# Patient Record
Sex: FEMALE | Race: WHITE | Employment: FULL TIME | URBAN - METROPOLITAN AREA
[De-identification: names, ages, dates, MRNs, and addresses within clinical notes are randomized per-mention and may not be internally consistent; named-entity substitution may affect disease eponyms.]

---

## 2017-10-04 ENCOUNTER — OFFICE VISIT (OUTPATIENT)
Dept: FAMILY MEDICINE | Facility: CLINIC | Age: 37
End: 2017-10-04
Payer: COMMERCIAL

## 2017-10-04 VITALS
DIASTOLIC BLOOD PRESSURE: 68 MMHG | SYSTOLIC BLOOD PRESSURE: 108 MMHG | WEIGHT: 127.5 LBS | HEART RATE: 78 BPM | RESPIRATION RATE: 20 BRPM | TEMPERATURE: 98.3 F | BODY MASS INDEX: 21.55 KG/M2 | OXYGEN SATURATION: 97 %

## 2017-10-04 DIAGNOSIS — R07.0 THROAT PAIN: Primary | ICD-10-CM

## 2017-10-04 LAB
DEPRECATED S PYO AG THROAT QL EIA: NORMAL
SPECIMEN SOURCE: NORMAL

## 2017-10-04 PROCEDURE — 99213 OFFICE O/P EST LOW 20 MIN: CPT | Performed by: FAMILY MEDICINE

## 2017-10-04 PROCEDURE — 87880 STREP A ASSAY W/OPTIC: CPT | Performed by: FAMILY MEDICINE

## 2017-10-04 PROCEDURE — 87081 CULTURE SCREEN ONLY: CPT | Performed by: FAMILY MEDICINE

## 2017-10-04 ASSESSMENT — ANXIETY QUESTIONNAIRES
2. NOT BEING ABLE TO STOP OR CONTROL WORRYING: NOT AT ALL
IF YOU CHECKED OFF ANY PROBLEMS ON THIS QUESTIONNAIRE, HOW DIFFICULT HAVE THESE PROBLEMS MADE IT FOR YOU TO DO YOUR WORK, TAKE CARE OF THINGS AT HOME, OR GET ALONG WITH OTHER PEOPLE: NOT DIFFICULT AT ALL
6. BECOMING EASILY ANNOYED OR IRRITABLE: NOT AT ALL
3. WORRYING TOO MUCH ABOUT DIFFERENT THINGS: NOT AT ALL
1. FEELING NERVOUS, ANXIOUS, OR ON EDGE: NOT AT ALL
5. BEING SO RESTLESS THAT IT IS HARD TO SIT STILL: NOT AT ALL
7. FEELING AFRAID AS IF SOMETHING AWFUL MIGHT HAPPEN: NOT AT ALL
GAD7 TOTAL SCORE: 0

## 2017-10-04 ASSESSMENT — ENCOUNTER SYMPTOMS
DIARRHEA: 0
CHILLS: 1
FEVER: 0
SPUTUM PRODUCTION: 0
ABDOMINAL PAIN: 0
CONSTIPATION: 0
SORE THROAT: 1
CARDIOVASCULAR NEGATIVE: 1
COUGH: 0
NAUSEA: 0
HEADACHES: 1
VOMITING: 0

## 2017-10-04 ASSESSMENT — PATIENT HEALTH QUESTIONNAIRE - PHQ9
5. POOR APPETITE OR OVEREATING: NOT AT ALL
SUM OF ALL RESPONSES TO PHQ QUESTIONS 1-9: 0

## 2017-10-04 NOTE — PROGRESS NOTES
HPI    SUBJECTIVE:   Diana Omer is a 36 year old female who presents to clinic today for the following health issues:    Acute Illness   Acute illness concerns: Strep throat  Onset: x4-5 days ago    Fever: no    Chills/Sweats: no    Headache (location?): YES- temples    Sinus Pressure:no    Conjunctivitis:  YES- burning    Ear Pain: no    Rhinorrhea: no    Congestion: no    Sore Throat: YES- white spots in throat     Cough: no    Wheeze: no    Decreased Appetite: YES    Nausea: YES    Vomiting: no    Diarrhea:  no    Dysuria/Freq.: no    Fatigue/Achiness: YES    Sick/Strep Exposure: no     Therapies Tried and outcome: Airborn and vitamins; feels better today    Throat really started yesterday, does actually feel a bit better.  Does have seasonal allergies, so always feels a bit run down this time of year.    Meds reviewed.  Does not smoke.    Review of Systems   Constitutional: Positive for chills and malaise/fatigue. Negative for fever.   HENT: Positive for sore throat. Negative for congestion.    Respiratory: Negative for cough and sputum production.    Cardiovascular: Negative.    Gastrointestinal: Negative for abdominal pain, constipation, diarrhea, nausea and vomiting.   Skin: Negative for rash.   Neurological: Positive for headaches.         Physical Exam   Constitutional: She is well-developed, well-nourished, and in no distress. No distress.   HENT:   Right Ear: Tympanic membrane, external ear and ear canal normal.   Left Ear: Tympanic membrane, external ear and ear canal normal.   Mouth/Throat: Posterior oropharyngeal edema and posterior oropharyngeal erythema present. No oropharyngeal exudate.   Eyes: Conjunctivae are normal.   Cardiovascular: Normal rate, regular rhythm and normal heart sounds.    Pulmonary/Chest: Effort normal and breath sounds normal.   Lymphadenopathy:     She has no cervical adenopathy.   Skin: Skin is warm and dry. No rash noted.   Nursing note and vitals reviewed.    (R07.0)  Throat pain  (primary encounter diagnosis)  Comment: neg strep, is actually feeling better.  Ibuprofen PRN  Plan: Rapid strep screen, Beta strep group A culture              RTC in 5 d if not feeling better.    David Poole MD

## 2017-10-04 NOTE — NURSING NOTE
"Chief Complaint   Patient presents with     URI       Initial /68  Pulse 78  Temp 98.3  F (36.8  C) (Oral)  Resp 20  Wt 127 lb 8 oz (57.8 kg)  SpO2 97%  Breastfeeding? No  BMI 21.55 kg/m2 Estimated body mass index is 21.55 kg/(m^2) as calculated from the following:    Height as of 10/29/15: 5' 4.5\" (1.638 m).    Weight as of this encounter: 127 lb 8 oz (57.8 kg).  Medication Reconciliation: complete   Gena Sharma CMA (AAMA)      "

## 2017-10-04 NOTE — MR AVS SNAPSHOT
After Visit Summary   10/4/2017    Diana Omer    MRN: [id number]           Patient Information     Date Of Birth          1980        Visit Information        Provider Department      10/4/2017 8:00 AM David Poole MD [facility]        Today's Diagnoses     Throat pain    -  1       Follow-ups after your visit        Follow-up notes from your care team     Return in about 5 days (around 10/9/2017), or if symptoms worsen or fail to improve.      Who to contact     If you have questions or need follow up information about today's clinic visit or your schedule please contact [facility] directly at [phone].  Normal or non-critical lab and imaging results will be communicated to you by MyChart, letter or phone within 4 business days after the clinic has received the results. If you do not hear from us within 7 days, please contact the clinic through [employer] or phone. If you have a critical or abnormal lab result, we will notify you by phone as soon as possible.  Submit refill requests through [employer] or call your pharmacy and they will forward the refill request to us. Please allow 3 business days for your refill to be completed.          Additional Information About Your Visit        MyChart Information     [employer] gives you secure access to your electronic health record. If you see a primary care provider, you can also send messages to your care team and make appointments. If you have questions, please call your primary care clinic.  If you do not have a primary care provider, please call [phone] and they will assist you.        Care EveryWhere ID     This is your Care EveryWhere ID. This could be used by other organizations to access your [place] medical records  [id number]        Your Vitals Were     Pulse Temperature Respirations Pulse Oximetry Breastfeeding? BMI (Body Mass Index)    78 98.3  F (36.8  C) (Oral) 20 97% No 21.55 kg/m2        Blood Pressure from Last 3 Encounters:   10/04/17 108/68   10/29/15 102/60   03/12/15 112/79    Weight from Last 3 Encounters:   10/04/17 127 lb 8 oz (57.8 kg)   10/29/15 124 lb (56.2 kg)   09/14/13 115 lb (52.2 kg)              We Performed the Following     Beta strep group A culture     Rapid strep screen        Primary Care Provider Office Phone # Fax #    David Alistair Poole [phone]       [id number]  [facility] [id number]        Equal Access to Services     [facility]: Hadii aad ku hadasho Soomaali, waaxda luqadaha, qaybta kaalmada adeegyada, chris iyer . So [facility] [phone].    ATENCIÓN: Si habla español, tiene a olivia disposición servicios gratuitos de asistencia lingüística. [facility] [phone].    We comply with applicable federal civil rights laws and [place] laws. We do not discriminate on the basis of race, color, national origin, age, disability, sex, sexual orientation, or gender identity.            Thank you!     Thank you for choosing [facility]  for your care. Our goal is always to provide you with excellent care. Hearing back from our patients is one way we can continue to improve our services. Please take a few minutes to complete the written survey that you may receive in the mail after your visit with us. Thank you!             Your Updated Medication List - Protect others around you: Learn how to safely use, store and throw away your medicines at www.disposemymeds.org.          This list is accurate as of: 10/4/17  8:31 AM.  Always use your most recent med list.                   Brand Name Dispense Instructions for use Diagnosis    MULTI FOR HER Tabs      1 TABLET DAILY    Knee pain       PROZAC PO           VITAMIN C PO      Take 500 mg by mouth        VITAMIN D (ERGOCALCIFEROL) PO      None Entered    Knee pain

## 2017-10-05 LAB
BACTERIA SPEC CULT: NORMAL
SPECIMEN SOURCE: NORMAL

## 2017-10-05 ASSESSMENT — ANXIETY QUESTIONNAIRES: GAD7 TOTAL SCORE: 0

## 2017-10-19 ENCOUNTER — OFFICE VISIT (OUTPATIENT)
Dept: FAMILY MEDICINE | Facility: CLINIC | Age: 37
End: 2017-10-19
Payer: COMMERCIAL

## 2017-10-19 VITALS
BODY MASS INDEX: 21.21 KG/M2 | TEMPERATURE: 98.3 F | WEIGHT: 127.3 LBS | SYSTOLIC BLOOD PRESSURE: 118 MMHG | HEART RATE: 86 BPM | DIASTOLIC BLOOD PRESSURE: 70 MMHG | OXYGEN SATURATION: 99 % | HEIGHT: 65 IN

## 2017-10-19 DIAGNOSIS — R10.11 RUQ ABDOMINAL PAIN: Primary | ICD-10-CM

## 2017-10-19 LAB
ALBUMIN SERPL-MCNC: 4.2 G/DL (ref 3.4–5)
ALP SERPL-CCNC: 56 U/L (ref 40–150)
ALT SERPL W P-5'-P-CCNC: 23 U/L (ref 0–50)
AST SERPL W P-5'-P-CCNC: 11 U/L (ref 0–45)
BILIRUB DIRECT SERPL-MCNC: 0.1 MG/DL (ref 0–0.2)
BILIRUB SERPL-MCNC: 0.3 MG/DL (ref 0.2–1.3)
PROT SERPL-MCNC: 8 G/DL (ref 6.8–8.8)

## 2017-10-19 PROCEDURE — 80076 HEPATIC FUNCTION PANEL: CPT | Performed by: FAMILY MEDICINE

## 2017-10-19 PROCEDURE — 82565 ASSAY OF CREATININE: CPT | Performed by: FAMILY MEDICINE

## 2017-10-19 PROCEDURE — 99213 OFFICE O/P EST LOW 20 MIN: CPT | Performed by: FAMILY MEDICINE

## 2017-10-19 PROCEDURE — 36415 COLL VENOUS BLD VENIPUNCTURE: CPT | Performed by: FAMILY MEDICINE

## 2017-10-19 RX ORDER — TRAMADOL HYDROCHLORIDE 50 MG/1
50-100 TABLET ORAL EVERY 6 HOURS PRN
Qty: 10 TABLET | Refills: 0 | Status: SHIPPED | OUTPATIENT
Start: 2017-10-19 | End: 2017-11-09

## 2017-10-19 NOTE — MR AVS SNAPSHOT
After Visit Summary   10/19/2017    Diana Omer    MRN: [id number]           Patient Information     Date Of Birth          1980        Visit Information        Provider Department      10/19/2017 9:45 AM Tasha Benitez MD [facility]        Today's Diagnoses     RUQ abdominal pain    -  1       Follow-ups after your visit        Your next 10 appointments already scheduled     Oct 23, 2017  6:00 PM CDT   MR ABDOMEN W/O & W CONTRAST with RHMR1   [facility] MRI ([facility])    [address]  [facility] [address]   [phone]           Take your medicines as usual, unless your doctor tells you not to. Bring a list of your current medicines to your exam (including vitamins, minerals and over-the-counter drugs). Also bring the results of similar scans you may have had.    The day before your exam, drink extra fluids at least six 8-ounce glasses (unless your doctor tells you to restrict your fluids).   Have a blood test (creatinine test) within 30 days of your exam. Go to your clinic or Diagnostic Imaging Department for this test.   Do not eat or drink for 6 hours prior to exam.  The MRI machine uses a strong magnet. Please wear clothes without metal (snaps, zippers). A sweatsuit works well, or we may give you a hospital gown.  Please remove any body piercings and hair extensions before you arrive. You will also remove watches, jewelry, hairpins, wallets, dentures, partial dental plates and hearing aids. You may wear contact lenses, and you may be able to wear your rings. We have a safe place to keep your personal items, but it is safer to leave them at home.   **IMPORTANT** THE INSTRUCTIONS BELOW ARE ONLY FOR THOSE PATIENTS WHO HAVE BEEN TOLD THEY WILL RECEIVE SEDATION OR GENERAL ANESTHESIA DURING THEIR MRI PROCEDURE:  IF YOU WILL RECEIVE SEDATION (take medicine to help you relax during your exam):   You must get the medicine from your doctor  before you arrive. Bring the medicine to the exam. Do not take it at home.   Arrive one hour early. Bring someone who can take you home after the test. Your medicine will make you sleepy. After the exam, you may not drive, take a bus or take a taxi by yourself.   No eating 8 hours before your exam. You may have clear liquids up until 4 hours before your exam. (Clear liquids include water, clear tea, black coffee and fruit juice without pulp.)  IF YOU WILL RECEIVE ANESTHESIA (be asleep for your exam):   Arrive 1 1/2 hours early. Bring someone who can take you home after the test. You may not drive, take a bus or take a taxi by yourself.   No eating 8 hours before your exam. You may have clear liquids up until 4 hours before your exam. (Clear liquids include water, clear tea, black coffee and fruit juice without pulp.)  If you have any questions, please contact your Imaging Department exam site.              Future tests that were ordered for you today     Open Future Orders        Priority Expected Expires Ordered    MR Abdomen w/o & w Contrast Routine  10/19/2018 10/19/2017            Who to contact     If you have questions or need follow up information about today's clinic visit or your schedule please contact [facility] directly at [phone].  Normal or non-critical lab and imaging results will be communicated to you by [employer], letter or phone within 4 business days after the clinic has received the results. If you do not hear from us within 7 days, please contact the clinic through [employer] or phone. If you have a critical or abnormal lab result, we will notify you by phone as soon as possible.  Submit refill requests through [employer] or call your pharmacy and they will forward the refill request to us. Please allow 3 business days for your refill to be completed.          Additional Information About Your Visit        [employer] Information     [employer] gives you secure access to your electronic  "health record. If you see a primary care provider, you can also send messages to your care team and make appointments. If you have questions, please call your primary care clinic.  If you do not have a primary care provider, please call [phone] and they will assist you.        Care EveryWhere ID     This is your Care EveryWhere ID. This could be used by other organizations to access your [place] medical records  [id number]        Your Vitals Were     Pulse Temperature Height Pulse Oximetry BMI (Body Mass Index)       86 98.3  F (36.8  C) (Oral) 5' 4.5\" (1.638 m) 99% 21.51 kg/m2        Blood Pressure from Last 3 Encounters:   10/19/17 118/70   10/04/17 108/68   10/29/15 102/60    Weight from Last 3 Encounters:   10/19/17 127 lb 4.8 oz (57.7 kg)   10/04/17 127 lb 8 oz (57.8 kg)   10/29/15 124 lb (56.2 kg)              We Performed the Following     Hepatic panel (Albumin, ALT, AST, Bili, Alk Phos, TP)          Today's Medication Changes          These changes are accurate as of: 10/19/17  1:11 PM.  If you have any questions, ask your nurse or doctor.               Start taking these medicines.        Dose/Directions    traMADol 50 MG tablet   Commonly known as:  ULTRAM   Used for:  RUQ abdominal pain   Started by:  Tasha Benitez MD        Dose:   mg   Take 1-2 tablets ( mg) by mouth every 6 hours as needed for pain maximum 3 tablet(s) per day   Quantity:  10 tablet   Refills:  0            Where to get your medicines      Some of these will need a paper prescription and others can be bought over the counter.  Ask your nurse if you have questions.     Bring a paper prescription for each of these medications     traMADol 50 MG tablet                Primary Care Provider Office Phone # Fax #    David Poole [phone]  [facility] [id number]        Equal Access to Services     GRIFFIN WILLIS AH: patricia Lobato, " marquise thompsongauravosmany connortai dawsonsoheila schafer. So [facility] [phone].    ATENCIÓN: Si candie lopez, tiene a olivia disposición servicios gratuitos de asistencia lingüística. [place] al [phone].    We comply with applicable federal civil rights laws and [place] laws. We do not discriminate on the basis of race, color, national origin, age, disability, sex, sexual orientation, or gender identity.            Thank you!     Thank you for choosing [facility]  for your care. Our goal is always to provide you with excellent care. Hearing back from our patients is one way we can continue to improve our services. Please take a few minutes to complete the written survey that you may receive in the mail after your visit with us. Thank you!             Your Updated Medication List - Protect others around you: Learn how to safely use, store and throw away your medicines at www.disposemymeds.org.          This list is accurate as of: 10/19/17  1:11 PM.  Always use your most recent med list.                   Brand Name Dispense Instructions for use Diagnosis    MULTI FOR HER Tabs      1 TABLET DAILY    Knee pain       PROZAC PO      Take 10 mg by mouth        traMADol 50 MG tablet    ULTRAM    10 tablet    Take 1-2 tablets ( mg) by mouth every 6 hours as needed for pain maximum 3 tablet(s) per day    RUQ abdominal pain       VITAMIN C PO      Take 500 mg by mouth        VITAMIN D (ERGOCALCIFEROL) PO      None Entered    Knee pain

## 2017-10-19 NOTE — NURSING NOTE
"Chief Complaint   Patient presents with     Abdominal Pain       Initial /70 (BP Location: Right arm, Patient Position: Chair, Cuff Size: Adult Regular)  Pulse 86  Temp 98.3  F (36.8  C) (Oral)  Ht 5' 4.5\" (1.638 m)  Wt 127 lb 4.8 oz (57.7 kg)  SpO2 99%  BMI 21.51 kg/m2 Estimated body mass index is 21.51 kg/(m^2) as calculated from the following:    Height as of this encounter: 5' 4.5\" (1.638 m).    Weight as of this encounter: 127 lb 4.8 oz (57.7 kg).  Medication Reconciliation: complete Montse Florentino CMA      "

## 2017-10-19 NOTE — PROGRESS NOTES
SUBJECTIVE:   Diana Omer is a 36 year old female who presents to clinic today for the following health issues:    ABDOMINAL   PAIN     Onset: last week     Description:   Character: Dull ache - feels like pushing on ribs  Location: right upper quadrant  Radiation: None    Intensity: mild    Progression of Symptoms:  worsening    Accompanying Signs & Symptoms:  Fever/Chills?: no   Gas/Bloating: YES  Nausea: YES  Vomitting: no   Diarrhea?: no   Constipation:no   Dysuria or Hematuria: no    History:   Trauma: no   Previous similar pain: YES - liver tumors before  Previous tests done: CT and MRI    Precipitating factors:   Does the pain change with:     Food: no      BM: no     Urination: no     Alleviating factors:      Therapies Tried and outcome:     LMP:         Has had similarly located pain in the past, was a dull ache in the past, now becoming more sharp with certain movements.     No pain with deep breathing.   Not eating much because she is worried she will exacerbate the pain or vomit.     Hx of liver adenomas, previously followed with [facility] but has not followed up in the past 4 years, was overwhelmed with all of her medical issues a few years back so elected not to follow up. Was told she should have yearly MRI of the liver to evaluate the adenoma.     Now has [facility] visit scheduled 11/15/17.         Problem list and histories reviewed & adjusted, as indicated.  Additional history: as documented    Patient Active Problem List   Diagnosis     CARDIOVASCULAR SCREENING; LDL GOAL LESS THAN 160     Depressive disorder     Leiomyoma of uterus     Implanted endometriosis     Dysmenorrhea     Past Surgical History:   Procedure Laterality Date     HYSTERECTOMY       LAPAROSCOPIC HYSTERECTOMY TOTAL  7/2011    [facility]       Social History   Substance Use Topics     Smoking status: Former Smoker     Smokeless tobacco: Never Used     Alcohol use No     Family History   Problem Relation Age of Onset     Family History  "Negative Mother      Hypertension Father      Heart Failure Father              Reviewed and updated as needed this visit by clinical staff     Reviewed and updated as needed this visit by Provider         ROS:  Constitutional, HEENT, cardiovascular, pulmonary, gi and gu systems are negative, except as otherwise noted.      OBJECTIVE:   /70 (BP Location: Right arm, Patient Position: Chair, Cuff Size: Adult Regular)  Pulse 86  Temp 98.3  F (36.8  C) (Oral)  Ht 5' 4.5\" (1.638 m)  Wt 127 lb 4.8 oz (57.7 kg)  SpO2 99%  BMI 21.51 kg/m2  Body mass index is 21.51 kg/(m^2).  GENERAL: healthy, alert and no distress  ABDOMEN: soft, mildly ttp on the RUQ, epigastrium, no hepatosplenomegaly, no masses and bowel sounds normal, negative Delaney's    Diagnostic Test Results:  none     ASSESSMENT/PLAN:     1. RUQ abdominal pain - unclear etiology but has hx of liver adenoma, similar sensation to previous, overdue for lab work and imaging. Will plan for these and pain manage if all looks stable until she can get into [facility].    - MR Abdomen w/o & w Contrast; Future  - Hepatic panel (Albumin, ALT, AST, Bili, Alk Phos, TP)  - traMADol (ULTRAM) 50 MG tablet; Take 1-2 tablets ( mg) by mouth every 6 hours as needed for pain maximum 3 tablet(s) per day  Dispense: 10 tablet; Refill: 0    Tasha Benitez MD  [facility]  "

## 2017-10-20 ENCOUNTER — MYC MEDICAL ADVICE (OUTPATIENT)
Dept: FAMILY MEDICINE | Facility: CLINIC | Age: 37
End: 2017-10-20
Payer: COMMERCIAL

## 2017-10-20 DIAGNOSIS — R10.11 RUQ ABDOMINAL PAIN: Primary | ICD-10-CM

## 2017-10-20 LAB
CREAT SERPL-MCNC: 0.68 MG/DL (ref 0.52–1.04)
GFR SERPL CREATININE-BSD FRML MDRD: >90 ML/MIN/1.7M2

## 2017-10-20 RX ORDER — LORAZEPAM 1 MG/1
1 TABLET ORAL EVERY 8 HOURS PRN
Qty: 1 TABLET | Refills: 0 | Status: SHIPPED | OUTPATIENT
Start: 2017-10-20 | End: 2017-11-09

## 2017-10-20 NOTE — TELEPHONE ENCOUNTER
Rx approved, fax to the [facility] in [facility] message sent.Helena Rodriguez

## 2017-10-20 NOTE — TELEPHONE ENCOUNTER
I was not aware of Cr level -  I ordered a Cr level. Can you see if lab can add it on?    I will send in pre-med. JH

## 2017-10-23 ENCOUNTER — HOSPITAL ENCOUNTER (OUTPATIENT)
Dept: MRI IMAGING | Facility: CLINIC | Age: 37
Discharge: HOME | End: 2017-10-23
Attending: FAMILY MEDICINE | Admitting: FAMILY MEDICINE
Payer: COMMERCIAL

## 2017-10-23 DIAGNOSIS — R10.11 RUQ ABDOMINAL PAIN: ICD-10-CM

## 2017-10-23 PROCEDURE — 25000128 H RX IP 250 OP 636: Performed by: RADIOLOGY

## 2017-10-23 PROCEDURE — 74183 MRI ABD W/O CNTR FLWD CNTR: CPT

## 2017-10-23 PROCEDURE — A9585 GADOBUTROL INJECTION: HCPCS | Performed by: RADIOLOGY

## 2017-10-23 RX ORDER — GADOBUTROL 604.72 MG/ML
7.5 INJECTION INTRAVENOUS ONCE
Status: COMPLETED | OUTPATIENT
Start: 2017-10-23 | End: 2017-10-23

## 2017-10-23 RX ADMIN — GADOBUTROL 6 ML: 604.72 INJECTION INTRAVENOUS at 17:47

## 2017-11-09 ENCOUNTER — OFFICE VISIT (OUTPATIENT)
Dept: FAMILY MEDICINE | Facility: CLINIC | Age: 37
End: 2017-11-09
Payer: COMMERCIAL

## 2017-11-09 VITALS
TEMPERATURE: 97.9 F | OXYGEN SATURATION: 100 % | DIASTOLIC BLOOD PRESSURE: 78 MMHG | HEART RATE: 83 BPM | WEIGHT: 126.1 LBS | BODY MASS INDEX: 21.31 KG/M2 | RESPIRATION RATE: 16 BRPM | SYSTOLIC BLOOD PRESSURE: 104 MMHG

## 2017-11-09 DIAGNOSIS — Z80.8 FH: SKIN CANCER: ICD-10-CM

## 2017-11-09 DIAGNOSIS — J45.990 EXERCISE-INDUCED ASTHMA: ICD-10-CM

## 2017-11-09 DIAGNOSIS — Z00.00 ROUTINE GENERAL MEDICAL EXAMINATION AT A HEALTH CARE FACILITY: Primary | ICD-10-CM

## 2017-11-09 DIAGNOSIS — F32.0 MILD MAJOR DEPRESSION (H): ICD-10-CM

## 2017-11-09 DIAGNOSIS — D22.9 ATYPICAL MOLE: ICD-10-CM

## 2017-11-09 PROCEDURE — 99395 PREV VISIT EST AGE 18-39: CPT | Performed by: FAMILY MEDICINE

## 2017-11-09 RX ORDER — ALBUTEROL SULFATE 90 UG/1
2 AEROSOL, METERED RESPIRATORY (INHALATION) EVERY 6 HOURS PRN
Qty: 1 INHALER | Refills: 0 | Status: SHIPPED | OUTPATIENT
Start: 2017-11-09

## 2017-11-09 ASSESSMENT — PAIN SCALES - GENERAL: PAINLEVEL: NO PAIN (0)

## 2017-11-09 NOTE — PROGRESS NOTES
"   SUBJECTIVE:   CC: Diana Omer is an 36 year old woman who presents for preventive health visit.     Healthy Habits:    Do you get at least three servings of calcium containing foods daily (dairy, green leafy vegetables, etc.)? {YES/NO, DAIRY INTAKE:600065::\"yes\"}    Amount of exercise or daily activities, outside of work: {AMOUNT EXERCISE:739840}    Problems taking medications regularly {Yes /No default:155917::\"No\"}    Medication side effects: {Yes /No default.:576525::\"No\"}    Have you had an eye exam in the past two years? {YESNOBLANK:078251}    Do you see a dentist twice per year? {YESNOBLANK:187717}    Do you have sleep apnea, excessive snoring or daytime drowsiness?{YESNOBLANK:677031}    {Outside tests to abstract? :555572}    {additional problems to add (Optional):601138}    Today's PHQ-2 Score:   PHQ-2 ( 1999 [employer]) 11/6/2017 10/4/2017   Q1: Little interest or pleasure in doing things 0 0   Q2: Feeling down, depressed or hopeless 0 0   PHQ-2 Score 0 0   Q1: Little interest or pleasure in doing things Not at all -   Q2: Feeling down, depressed or hopeless Not at all -   PHQ-2 Score 0 -     {PHQ-2 LOOK IN ASSESSMENTS (Optional) :263676}  Abuse: Current or Past(Physical, Sexual or Emotional)- {YES/NO/NA:781964}  Do you feel safe in your environment - {YES/NO/NA:897445}    Social History   Substance Use Topics     Smoking status: Former Smoker     Smokeless tobacco: Never Used     Alcohol use No     {ETOH AUDIT:946229}    Reviewed orders with patient.  Reviewed health maintenance and updated orders accordingly - {Yes/No:907092::\"Yes\"}  {Chronicprobdata (Optional):516540}    {Mammo Decision Support (Optional):904518}    Pertinent mammograms are reviewed under the imaging tab.  History of abnormal Pap smear: {PAP HX:294924}    Reviewed and updated as needed this visit by clinical staff         Reviewed and updated as needed this visit by Provider        {HISTORY OPTIONS (Optional):637580}    ROS:  {FEMALE " "PREVENTATIVE ROS:716561}    OBJECTIVE:   There were no vitals taken for this visit.  EXAM:  {Exam Choices:776218}    ASSESSMENT/PLAN:   {Diag Picklist:862856}    COUNSELING:   {FEMALE COUNSELING MESSAGES:110424::\"Reviewed preventive health counseling, as reflected in patient instructions\"}    {BP Counseling- Complete if BP >= 120/80  (Optional):181943}     reports that she has quit smoking. She has never used smokeless tobacco.  {Tobacco Cessation -- Complete if patient is a smoker (Optional):390176}  Estimated body mass index is 21.51 kg/(m^2) as calculated from the following:    Height as of 10/19/17: 5' 4.5\" (1.638 m).    Weight as of 10/19/17: 127 lb 4.8 oz (57.7 kg).   {Weight Management Plan (ACO) Complete if BMI is abnormal-  Ages 18-64  BMI >24.9.  Age 65+ with BMI <23 or >30 (Optional):074969}    Counseling Resources:  ATP IV Guidelines  Pooled Cohorts Equation Calculator  Breast Cancer Risk Calculator  FRAX Risk Assessment  ICSI Preventive Guidelines  Dietary Guidelines for Americans, 2010  USDA's MyPlate  ASA Prophylaxis  Lung CA Screening    Citlali Edgar MD  [facility]  "

## 2017-11-09 NOTE — PROGRESS NOTES
SUBJECTIVE:   CC: Diana Omer is an 36 year old woman who presents for preventive health visit.     Physical   Annual:     Getting at least 3 servings of Calcium per day::  Yes    Bi-annual eye exam::  Yes    Dental care twice a year::  Yes    Sleep apnea or symptoms of sleep apnea::  None    Diet::  Regular (no restrictions)    Frequency of exercise::  None    Taking medications regularly::  Yes    Medication side effects::  None    Additional concerns today::  YES    Hx of liver adenoma  Patient was going to [facility] and has not had a PCP in quite some time. Has blood work getting done next week with [facility]. She recently had lab work done with Dr. Allen which were all normal. The MRI did not reveal anything of significance. She has always had 6 cysts in the liver and one large liver adenoma, but recent MRI did not show them. Have been doing follow up tests from [id number] and reports that it has been shrinking since but she is shocked that it has completely cleared. Will be going to [facility] next week to follow up with them.     Dysmenorrhea  Had a full hysterectomy in 2011 but still has her left ovary. Still experiences some pain, but is unsure if it is mostly IBS pain. She is unable to take hormones due to medical hx with her liver.     Depression  Patient is taking 10 mg of prozac for mostly depression and a bit of anxiety. Used to take 20 mg but went down to 10 mg.     Asthma   She has an inhaler but rarely uses it and states that the asthma is only exercise induced.     PROBLEMS TO ADD ON...  -Recently had a cold and states that symptoms have been improving.   -She has a family hx of skin cancer. Her mother had basal cell, aunt had melanoma, and patient had a pre- melanoma removed from her foot.   -Patient used to smoke for a long time but no longer does. Asked patient if she is currently smoking , she denied this. She denies living with someone who smokes.     Today's PHQ-2 Score:   PHQ-2 ( 1999  [employer]) 11/6/2017   Q1: Little interest or pleasure in doing things 0   Q2: Feeling down, depressed or hopeless 0   PHQ-2 Score 0   Q1: Little interest or pleasure in doing things Not at all   Q2: Feeling down, depressed or hopeless Not at all   PHQ-2 Score 0     Abuse: Current or Past(Physical, Sexual or Emotional)- NO  Do you feel safe in your environment - YES    Social History   Substance Use Topics     Smoking status: Former Smoker     Smokeless tobacco: Never Used     Alcohol use No     The patient does not drink >3 drinks per day nor >7 drinks per week.    Reviewed orders with patient.  Reviewed health maintenance and updated orders accordingly - Yes  Labs reviewed in EPIC  BP Readings from Last 3 Encounters:   11/09/17 104/78   10/19/17 118/70   10/04/17 108/68    Wt Readings from Last 3 Encounters:   11/09/17 57.2 kg (126 lb 1.6 oz)   10/19/17 57.7 kg (127 lb 4.8 oz)   10/04/17 57.8 kg (127 lb 8 oz)         Mammogram not appropriate for this patient based on age.    Pertinent mammograms are reviewed under the imaging tab.  History of abnormal Pap smear: Status post benign hysterectomy. Health Maintenance and Surgical History updated.    Reviewed and updated as needed this visit by clinical staffTobacco  Allergies  Meds  Problems  Med Hx  Surg Hx  Fam Hx  Soc Hx        Reviewed and updated as needed this visit by Provider        Review of Systems   C: NEGATIVE for fever, chills, change in weight  I: NEGATIVE for worrisome rashes, moles or lesions  E: NEGATIVE for vision changes or irritation  ENT: NEGATIVE for ear, mouth and throat problems  R: NEGATIVE for significant cough or SOB  B: NEGATIVE for masses, tenderness or discharge  CV: NEGATIVE for chest pain, palpitations or peripheral edema  GI: NEGATIVE for nausea, abdominal pain, heartburn, or change in bowel habits  : NEGATIVE for unusual urinary or vaginal symptoms.  M: NEGATIVE for significant arthralgias or myalgia  N: NEGATIVE for weakness,  dizziness or paresthesias  P: POSITIVE for hx of depression NEGATIVE for changes in mood or affect     This document serves as a record of the services and decisions personally performed and made by Citlali Edgar MD. It was created on her behalf by Jane Panda, a trained medical scribe. The creation of this document is based on the provider's statements to the medical scribe.  Jane Panda November 9, 2017 7:57 AM     OBJECTIVE:   /78 (BP Location: Right arm, Patient Position: Chair, Cuff Size: Adult Regular)  Pulse 83  Temp 97.9  F (36.6  C) (Oral)  Resp 16  Wt 126 lb 1.6 oz (57.2 kg)  SpO2 100%  BMI 21.31 kg/m2  Physical Exam  GENERAL: healthy, alert and no distress, noticed tobacco smoke smell  EYES: Eyes grossly normal to inspection, PERRL and conjunctivae and sclerae normal  HENT: ear canals and TM's normal, nose and mouth without ulcers or lesions  NECK: no adenopathy, no asymmetry, masses, or scars and thyroid normal to palpation  RESP: lungs clear to auscultation - no rales, rhonchi or wheezes  BREAST: normal without masses, tenderness or nipple discharge and no palpable axillary masses or adenopathy  CV: regular rate and rhythm, normal S1 S2, no S3 or S4, no murmur, click or rub, no peripheral edema and peripheral pulses strong  ABDOMEN: soft, nontender, no hepatosplenomegaly, no masses and bowel sounds normal  MS: no gross musculoskeletal defects noted, no edema  SKIN: no suspicious lesions or rashes  NEURO: Normal strength and tone, mentation intact and speech normal  PSYCH: mentation appears normal, affect normal/bright    ASSESSMENT/PLAN:   (Z00.00) Routine general medical examination at a health care facility  (primary encounter diagnosis)  Comment: Patient is overall doing well. Recent lab work from Dr. Benitez was normal. Patient has blood work scheduled next week at [facility] so she declined having any performed today.  Normal exam, including abd exam    (J45.990) Exercise-induced  "asthma  Comment: Well controlled. Patient rarely needs to use albuterol. Refilled prescription.   Plan: albuterol (PROAIR HFA/PROVENTIL HFA/VENTOLIN         HFA) 108 (90 BASE) MCG/ACT Inhaler          (D22.9) Atypical mole  Plan: DERMATOLOGY REFERRAL  (Z80.8) FH: skin cancer  Comment: Referred to dermatology due to family hx of skin cancer and previous medical hx of skin cancer.   Plan: DERMATOLOGY REFERRAL        (F32.0) Mild major depression (H)  Comment: Controlled. Continue taking 10 mg of prozac. Pt will call when due for refill    COUNSELING:  Reviewed preventive health counseling, as reflected in patient instructions       Regular exercise       Healthy diet/nutrition     reports that she has quit smoking. She has never used smokeless tobacco.  Estimated body mass index is 21.31 kg/(m^2) as calculated from the following:    Height as of 10/19/17: 5' 4.5\" (1.638 m).    Weight as of this encounter: 126 lb 1.6 oz (57.2 kg).     Counseling Resources:  ATP IV Guidelines  Pooled Cohorts Equation Calculator  Breast Cancer Risk Calculator  FRAX Risk Assessment  ICSI Preventive Guidelines  Dietary Guidelines for Americans, 2010  USDA's MyPlate  ASA Prophylaxis  Lung CA Screening    The information in this document, created by the medical scribe for me, accurately reflects the services I personally performed and the decisions made by me. I have reviewed and approved this document for accuracy prior to leaving the patient care area.  November 9, 2017 7:57 AM    Citlali Edgar MD  [facility]  "

## 2017-11-09 NOTE — MR AVS SNAPSHOT
After Visit Summary   11/9/2017    Diana Omer    MRN: [id number]           Patient Information     Date Of Birth          1980        Visit Information        Provider Department      11/9/2017 7:40 AM Citlali Edgar MD [facility]        Today's Diagnoses     Routine general medical examination at a health care facility    -  1    Exercise-induced asthma        FH: skin cancer        Atypical mole        Mild major depression (H)          Care Instructions      Preventive Health Recommendations  Female Ages 26 - 39  Yearly exam:   See your health care provider every year in order to    Review health changes.     Discuss preventive care.      Review your medicines if you your doctor has prescribed any.    Until age 30: Get a Pap test every three years (more often if you have had an abnormal result).    After age 30: Talk to your doctor about whether you should have a Pap test every 3 years or have a Pap test with HPV screening every 5 years.   You do not need a Pap test if your uterus was removed (hysterectomy) and you have not had cancer.  You should be tested each year for STDs (sexually transmitted diseases), if you're at risk.   Talk to your provider about how often to have your cholesterol checked.  If you are at risk for diabetes, you should have a diabetes test (fasting glucose).  Shots: Get a flu shot each year. Get a tetanus shot every 10 years.   Nutrition:     Eat at least 5 servings of fruits and vegetables each day.    Eat whole-grain bread, whole-wheat pasta and brown rice instead of white grains and rice.    Talk to your provider about Calcium and Vitamin D.     Lifestyle    Exercise at least 150 minutes a week (30 minutes a day, 5 days of the week). This will help you control your weight and prevent disease.    Limit alcohol to one drink per day.    No smoking.     Wear sunscreen to prevent skin cancer.    See your dentist every six months for an exam  and cleaning.            Follow-ups after your visit        Additional Services     DERMATOLOGY REFERRAL       Your provider has referred you to: [facility]: [facility] [place] [phone]    Please be aware that coverage of these services is subject to the terms and limitations of your health insurance plan.  Call member services at your health plan with any benefit or coverage questions.      Please bring the following with you to your appointment:    (1) Any X-Rays, CTs or MRIs which have been performed.  Contact the facility where they were done to arrange for  prior to your scheduled appointment.    (2) List of current medications  (3) This referral request   (4) Any documents/labs given to you for this referral                  Follow-up notes from your care team     Return in about 6 months (around 5/9/2018).      Who to contact     If you have questions or need follow up information about today's clinic visit or your schedule please contact [facility] directly at [phone].  Normal or non-critical lab and imaging results will be communicated to you by MyChart, letter or phone within 4 business days after the clinic has received the results. If you do not hear from us within 7 days, please contact the clinic through [facility] or phone. If you have a critical or abnormal lab result, we will notify you by phone as soon as possible.  Submit refill requests through [facility] or call your pharmacy and they will forward the refill request to us. Please allow 3 business days for your refill to be completed.          Additional Information About Your Visit        [employer] Information     [facility] gives you secure access to your electronic health record. If you see a primary care provider, you can also send messages to your care team and make appointments. If you have questions, please call your primary care clinic.  If you do not have a primary care provider, please call  [phone] and they will assist you.        Care EveryWhere ID     This is your Care EveryWhere ID. This could be used by other organizations to access your [facility] medical records  [id number]        Your Vitals Were     Pulse Temperature Respirations Pulse Oximetry BMI (Body Mass Index)       83 97.9  F (36.6  C) (Oral) 16 100% 21.31 kg/m2        Blood Pressure from Last 3 Encounters:   11/09/17 104/78   10/19/17 118/70   10/04/17 108/68    Weight from Last 3 Encounters:   11/09/17 126 lb 1.6 oz (57.2 kg)   10/19/17 127 lb 4.8 oz (57.7 kg)   10/04/17 127 lb 8 oz (57.8 kg)              We Performed the Following     DERMATOLOGY REFERRAL          Today's Medication Changes          These changes are accurate as of: 11/9/17  8:13 AM.  If you have any questions, ask your nurse or doctor.               Start taking these medicines.        Dose/Directions    albuterol 108 (90 BASE) MCG/ACT Inhaler   Commonly known as:  PROAIR HFA/PROVENTIL HFA/VENTOLIN HFA   Used for:  Exercise-induced asthma   Started by:  Citlali Edgar MD        Dose:  2 puff   Inhale 2 puffs into the lungs every 6 hours as needed for shortness of breath / dyspnea or wheezing   Quantity:  1 Inhaler   Refills:  0            Where to get your medicines      These medications were sent to [facility]/pharmacy #[id number] - [place], [place] - [address]  [address] [address] [address]     Phone:  [phone]     albuterol 108 (90 BASE) MCG/ACT Inhaler                Primary Care Provider Office Phone # Fax #    David Poole [phone]       [address] [address] [facility] [facility] [address]        Equal Access to Services     GRIFFIN WILLIS AH: Genie Lopez, patricia luswapnil, qachelsita kaalmada felicitas, chris schafer. So [facility] [phone].    ATENCIÓN: Si habla español, tiene a olivia disposición servicios gratuitos de asistencia lingüística. Llame al [phone].    We comply with  applicable federal civil rights laws and [place] laws. We do not discriminate on the basis of race, color, national origin, age, disability, sex, sexual orientation, or gender identity.            Thank you!     Thank you for choosing [facility]  for your care. Our goal is always to provide you with excellent care. Hearing back from our patients is one way we can continue to improve our services. Please take a few minutes to complete the written survey that you may receive in the mail after your visit with us. Thank you!             Your Updated Medication List - Protect others around you: Learn how to safely use, store and throw away your medicines at www.disposemymeds.org.          This list is accurate as of: 11/9/17  8:13 AM.  Always use your most recent med list.                   Brand Name Dispense Instructions for use Diagnosis    albuterol 108 (90 BASE) MCG/ACT Inhaler    PROAIR HFA/PROVENTIL HFA/VENTOLIN HFA    1 Inhaler    Inhale 2 puffs into the lungs every 6 hours as needed for shortness of breath / dyspnea or wheezing    Exercise-induced asthma       MULTI FOR HER Tabs      1 TABLET DAILY    Knee pain       PROZAC PO      Take 10 mg by mouth        VITAMIN C PO      Take 500 mg by mouth        VITAMIN D (ERGOCALCIFEROL) PO      None Entered    Knee pain

## 2017-11-09 NOTE — NURSING NOTE
"Chief Complaint   Patient presents with     Physical     NO PAP     Blood Draw     LABS.  Patient had BLACK coffee this morning      Initial /78 (BP Location: Right arm, Patient Position: Chair, Cuff Size: Adult Regular)  Pulse 83  Temp 97.9  F (36.6  C) (Oral)  Resp 16  Wt 126 lb 1.6 oz (57.2 kg)  SpO2 100%  BMI 21.31 kg/m2 Estimated body mass index is 21.31 kg/(m^2) as calculated from the following:    Height as of 10/19/17: 5' 4.5\" (1.638 m).    Weight as of this encounter: 126 lb 1.6 oz (57.2 kg).  BP completed using cuff size regular RIGHT arm.    Lisa Magill, CMA    "

## 2017-11-10 ENCOUNTER — TELEPHONE (OUTPATIENT)
Dept: FAMILY MEDICINE | Facility: CLINIC | Age: 37
End: 2017-11-10

## 2017-11-10 NOTE — TELEPHONE ENCOUNTER
Panel Management Review      Patient has the following on her problem list:     Depression / Dysthymia review    Measure:  Needs PHQ-9 score of 4 or less during index window.  Administer PHQ-9 and if score is 5 or more, send encounter to provider for next steps.    5 - 7 month window range: last one completed on 10/04/17    PHQ-9 SCORE 10/4/2017   Total Score 0       If PHQ-9 recheck is 5 or more, route to provider for next steps.    Patient is due for:  None    Asthma review   No flowsheet data found.   1. Is Asthma diagnosis on the Problem List? Yes    2. Is Asthma listed on Health Maintenance? Yes    3. Patient is due for:  ACT          Composite cancer screening  Chart review shows that this patient is due/due soon for the following None  Summary:    Patient is due/failing the following:   ACT and PHYSICAL    Action needed:   Patient needs office visit for physical.  Patient needs to do ACT.    Type of outreach:    NONE.  Patient came in on 11/09/17 for a physical.      Questions for provider review:    None                                                                                                                                    Lisa Magill, CMA

## 2017-11-15 ENCOUNTER — TRANSFERRED RECORDS (OUTPATIENT)
Dept: HEALTH INFORMATION MANAGEMENT | Facility: CLINIC | Age: 37
End: 2017-11-15

## 2018-02-23 ENCOUNTER — OFFICE VISIT (OUTPATIENT)
Dept: FAMILY MEDICINE | Facility: CLINIC | Age: 38
End: 2018-02-23
Payer: COMMERCIAL

## 2018-02-23 VITALS
SYSTOLIC BLOOD PRESSURE: 106 MMHG | DIASTOLIC BLOOD PRESSURE: 60 MMHG | TEMPERATURE: 97.9 F | BODY MASS INDEX: 21.46 KG/M2 | HEART RATE: 87 BPM | OXYGEN SATURATION: 100 % | RESPIRATION RATE: 16 BRPM | HEIGHT: 65 IN | WEIGHT: 128.8 LBS

## 2018-02-23 DIAGNOSIS — H00.019 HORDEOLUM, UNSPECIFIED HORDEOLUM TYPE, UNSPECIFIED LATERALITY: Primary | ICD-10-CM

## 2018-02-23 PROCEDURE — 99213 OFFICE O/P EST LOW 20 MIN: CPT | Performed by: PHYSICIAN ASSISTANT

## 2018-02-23 RX ORDER — DOXYCYCLINE 100 MG/1
100 CAPSULE ORAL 2 TIMES DAILY
Qty: 20 CAPSULE | Refills: 0 | Status: SHIPPED | OUTPATIENT
Start: 2018-02-23 | End: 2018-03-23

## 2018-02-23 RX ORDER — POLYMYXIN B SULFATE AND TRIMETHOPRIM 1; 10000 MG/ML; [USP'U]/ML
1 SOLUTION OPHTHALMIC 3 TIMES DAILY
Qty: 10 ML | Refills: 0 | Status: SHIPPED | OUTPATIENT
Start: 2018-02-23 | End: 2018-04-30

## 2018-02-23 RX ORDER — ERYTHROMYCIN 5 MG/G
1 OINTMENT OPHTHALMIC AT BEDTIME
Qty: 1 TUBE | Refills: 0 | Status: SHIPPED | OUTPATIENT
Start: 2018-02-23 | End: 2018-08-02

## 2018-02-23 NOTE — MR AVS SNAPSHOT
After Visit Summary   2/23/2018    Diana Omer    MRN: [id number]           Patient Information     Date Of Birth          1980        Visit Information        Provider Department      2/23/2018 8:20 AM Javi Clements PA-C [facility]        Today's Diagnoses     Hordeolum, unspecified hordeolum type, unspecified laterality    -  1      Care Instructions      Sty (or Stye)  A sty is an infection of the oil gland of the eyelid. It may develop into a small pocket of pus (an abscess). This can cause pain, redness, and swelling. In early stages, a sty is treated with antibiotic cream, eye drops, or a small towel soaked in warm water (a warm compress). More severe cases may need to be opened and drained by a healthcare provider.  Home care    Eye drops or ointment are usually prescribed to treat the infection. Use these as directed.     Artificial tears may also be used to lubricate the eye and make it more comfortable. You can buy these over the counter without a prescription. Talk with your healthcare provider before using any over-the-counter treatment for a sty.    Apply a warm, damp towel to the affected eye for at least 5 minutes, 3 to 4 times a day for a week. Warm compresses open the pores and speed the healing. But if the compresses are too hot, they may burn your eyelid.    Sometimes the sty will drain with this treatment alone. If this happens, keep using the antibiotic until all the redness and swelling are gone.    Wash your hands before and after touching the infected eyelid to avoid spreading the infection.    Don t squeeze or try to break open the sty.  Follow-up care  Follow up with your healthcare provider, or as advised.   When to seek medical advice  Call your healthcare provider right away if any of these occur:    Increase in swelling or redness around the eyelid after 48 to 72 hours    Increase in eye pain or the eyelid blisters    Increase in  warmth--the eyelid feels hot    Drainage of blood or thick pus from the sty    Blister on the eyelid    Inability to open the eyelid due to swelling    Fever of 100.4 F (38 C) or above, or as directed by your provider    Vision changes    Headache or stiff neck    The sty comes back  Date Last Reviewed: 8/1/2017 2000-2017 [employer]. [address] [place] [address]. All rights reserved. This information is not intended as a substitute for professional medical care. Always follow your healthcare professional's instructions.                Follow-ups after your visit        Follow-up notes from your care team     Return in about 2 weeks (around 3/9/2018) for Follow up.      Who to contact     If you have questions or need follow up information about today's clinic visit or your schedule please contact [facility] directly at [phone].  Normal or non-critical lab and imaging results will be communicated to you by MyChart, letter or phone within 4 business days after the clinic has received the results. If you do not hear from us within 7 days, please contact the clinic through [employer] or phone. If you have a critical or abnormal lab result, we will notify you by phone as soon as possible.  Submit refill requests through [employer] or call your pharmacy and they will forward the refill request to us. Please allow 3 business days for your refill to be completed.          Additional Information About Your Visit        MyChart Information     [employer] gives you secure access to your electronic health record. If you see a primary care provider, you can also send messages to your care team and make appointments. If you have questions, please call your primary care clinic.  If you do not have a primary care provider, please call [phone] and they will assist you.        Care EveryWhere ID     This is your Care EveryWhere ID. This could be used by other organizations to access  "your [facility] medical records  [id number]        Your Vitals Were     Pulse Temperature Respirations Height Pulse Oximetry Breastfeeding?    87 97.9  F (36.6  C) (Oral) 16 5' 4.5\" (1.638 m) 100% No    BMI (Body Mass Index)                   21.77 kg/m2            Blood Pressure from Last 3 Encounters:   02/23/18 106/60   11/09/17 104/78   10/19/17 118/70    Weight from Last 3 Encounters:   02/23/18 128 lb 12.8 oz (58.4 kg)   11/09/17 126 lb 1.6 oz (57.2 kg)   10/19/17 127 lb 4.8 oz (57.7 kg)              Today, you had the following     No orders found for display         Today's Medication Changes          These changes are accurate as of 2/23/18  8:45 AM.  If you have any questions, ask your nurse or doctor.               Start taking these medicines.        Dose/Directions    doxycycline 100 MG capsule   Commonly known as:  VIBRAMYCIN   Used for:  Hordeolum, unspecified hordeolum type, unspecified laterality   Started by:  Javi Clements PA-C        Dose:  100 mg   Take 1 capsule (100 mg) by mouth 2 times daily   Quantity:  20 capsule   Refills:  0       erythromycin ophthalmic ointment   Commonly known as:  ROMYCIN   Used for:  Hordeolum, unspecified hordeolum type, unspecified laterality   Started by:  Javi Clements PA-C        Dose:  1 Application   Place 1 Application into both eyes At Bedtime   Quantity:  1 Tube   Refills:  0       trimethoprim-polymyxin b ophthalmic solution   Commonly known as:  POLYTRIM   Used for:  Hordeolum, unspecified hordeolum type, unspecified laterality   Started by:  Javi Clements PA-C        Dose:  1 drop   Apply 1 drop to eye 3 times daily for 7 days   Quantity:  10 mL   Refills:  0            Where to get your medicines      These medications were sent to [facility]/pharmacy #[id number] - [place] - [address]  [address], [facility] [address]     Phone:  [phone]     doxycycline 100 MG capsule    erythromycin ophthalmic ointment    " trimethoprim-polymyxin b ophthalmic solution                Primary Care Provider Office Phone # Fax #    David Alistair Poole [phone]       [id number]  [address] [facility] [address]        Equal Access to Services     GRIFFIN WILLIS : Hadii narcisa thomason radha Soalessandra, waaxda luqadaha, qaybta kaalmada adeegyada, chris merino jaylon schafer. So [facility] [phone].    ATENCIÓN: Si habla español, tiene a olivia disposición servicios gratuitos de asistencia lingüística. Llame al [phone].    We comply with applicable federal civil rights laws and [place] laws. We do not discriminate on the basis of race, color, national origin, age, disability, sex, sexual orientation, or gender identity.            Thank you!     Thank you for choosing [facility]  for your care. Our goal is always to provide you with excellent care. Hearing back from our patients is one way we can continue to improve our services. Please take a few minutes to complete the written survey that you may receive in the mail after your visit with us. Thank you!             Your Updated Medication List - Protect others around you: Learn how to safely use, store and throw away your medicines at www.disposemymeds.org.          This list is accurate as of 2/23/18  8:45 AM.  Always use your most recent med list.                   Brand Name Dispense Instructions for use Diagnosis    albuterol 108 (90 BASE) MCG/ACT Inhaler    PROAIR HFA/PROVENTIL HFA/VENTOLIN HFA    1 Inhaler    Inhale 2 puffs into the lungs every 6 hours as needed for shortness of breath / dyspnea or wheezing    Exercise-induced asthma       doxycycline 100 MG capsule    VIBRAMYCIN    20 capsule    Take 1 capsule (100 mg) by mouth 2 times daily    Hordeolum, unspecified hordeolum type, unspecified laterality       erythromycin ophthalmic ointment    ROMYCIN    1 Tube    Place 1 Application into both eyes At Bedtime    Hordeolum, unspecified  hordeolum type, unspecified laterality       MULTI FOR HER Tabs      1 TABLET DAILY    Knee pain       PROZAC PO      Take 10 mg by mouth        trimethoprim-polymyxin b ophthalmic solution    POLYTRIM    10 mL    Apply 1 drop to eye 3 times daily for 7 days    Hordeolum, unspecified hordeolum type, unspecified laterality       VITAMIN C PO      Take 500 mg by mouth        VITAMIN D (ERGOCALCIFEROL) PO      None Entered    Knee pain

## 2018-02-23 NOTE — NURSING NOTE
"Chief Complaint   Patient presents with     Stye / Hordeolum Evaluation       Initial /60 (BP Location: Right arm, Patient Position: Chair, Cuff Size: Adult Regular)  Pulse 87  Temp 97.9  F (36.6  C) (Oral)  Resp 16  Ht 5' 4.5\" (1.638 m)  Wt 128 lb 12.8 oz (58.4 kg)  SpO2 100%  Breastfeeding? No  BMI 21.77 kg/m2 Estimated body mass index is 21.77 kg/(m^2) as calculated from the following:    Height as of this encounter: 5' 4.5\" (1.638 m).    Weight as of this encounter: 128 lb 12.8 oz (58.4 kg).  Medication Reconciliation: complete   Gena Sharma CMA (AAMA)  "

## 2018-02-23 NOTE — PATIENT INSTRUCTIONS
Sty (or Stye)  A sty is an infection of the oil gland of the eyelid. It may develop into a small pocket of pus (an abscess). This can cause pain, redness, and swelling. In early stages, a sty is treated with antibiotic cream, eye drops, or a small towel soaked in warm water (a warm compress). More severe cases may need to be opened and drained by a healthcare provider.  Home care    Eye drops or ointment are usually prescribed to treat the infection. Use these as directed.     Artificial tears may also be used to lubricate the eye and make it more comfortable. You can buy these over the counter without a prescription. Talk with your healthcare provider before using any over-the-counter treatment for a sty.    Apply a warm, damp towel to the affected eye for at least 5 minutes, 3 to 4 times a day for a week. Warm compresses open the pores and speed the healing. But if the compresses are too hot, they may burn your eyelid.    Sometimes the sty will drain with this treatment alone. If this happens, keep using the antibiotic until all the redness and swelling are gone.    Wash your hands before and after touching the infected eyelid to avoid spreading the infection.    Don t squeeze or try to break open the sty.  Follow-up care  Follow up with your healthcare provider, or as advised.   When to seek medical advice  Call your healthcare provider right away if any of these occur:    Increase in swelling or redness around the eyelid after 48 to 72 hours    Increase in eye pain or the eyelid blisters    Increase in warmth--the eyelid feels hot    Drainage of blood or thick pus from the sty    Blister on the eyelid    Inability to open the eyelid due to swelling    Fever of 100.4 F (38 C) or above, or as directed by your provider    Vision changes    Headache or stiff neck    The sty comes back  Date Last Reviewed: 8/1/2017 2000-2017 [employer]. [address] [place] [address], [place] [address]. All rights  reserved. This information is not intended as a substitute for professional medical care. Always follow your healthcare professional's instructions.

## 2018-02-23 NOTE — PROGRESS NOTES
"HPI    SUBJECTIVE:   Diana Omer is a 37 year old female who presents to clinic today for the following health issues:    Concern - Stye  Onset: off and on x2 months    Description:   Stye's in both eyes; patient states that they feel more like they are on the underside of her eyelid    Intensity: 5/10    Progression of Symptoms:  worsening    Accompanying Signs & Symptoms:  Mild discharge in her eyes in the morning; pain, redness and swelling    Previous history of similar problem:   Ongoing problem since December 2017    Precipitating factors:   Worsened by: None    Alleviating factors:  Improved by: warm compress    Therapies Tried and outcome: warm compress, eye creams, eyelid scrub, eye drops; warm compress works the best    Patient here with a history of stye's that are coming and going in different locations for a couple months.  She will get one and then another in a different location.  She has been self treating with all of the above treatments.      Problem list and histories reviewed & adjusted, as indicated.  Additional history: as documented      Reviewed and updated as needed this visit by clinical staff       Reviewed and updated as needed this visit by Provider         ROS:  Constitutional, HEENT, cardiovascular, pulmonary, gi and gu systems are negative, except as otherwise noted.    OBJECTIVE:     /60 (BP Location: Right arm, Patient Position: Chair, Cuff Size: Adult Regular)  Pulse 87  Temp 97.9  F (36.6  C) (Oral)  Resp 16  Ht 5' 4.5\" (1.638 m)  Wt 128 lb 12.8 oz (58.4 kg)  SpO2 100%  Breastfeeding? No  BMI 21.77 kg/m2  Body mass index is 21.77 kg/(m^2).  GENERAL: healthy, alert and no distress  EYES: conjunctivae and sclerae normal and eyelids- left eye upper lid swollen with erythema and tenderness, right eye with small stye lower lid and larger 2-3mm internal hordeolum present upper lid  NECK: no adenopathy, no asymmetry, masses, or scars and thyroid normal to palpation  MS: " no gross musculoskeletal defects noted, no edema  SKIN: no suspicious lesions or rashes  PSYCH: mentation appears normal, affect normal/bright    Diagnostic Test Results:  none     ASSESSMENT/PLAN:   1. Hordeolum, unspecified hordeolum type, unspecified laterality  Will treat with oral ABX and then have her do drops during the day and ointment at night.  Continue warm compress and lid washes.  If not improved in a couple weeks consider ophthalmology referral.  Follow up sooner prn.  - doxycycline (VIBRAMYCIN) 100 MG capsule; Take 1 capsule (100 mg) by mouth 2 times daily  Dispense: 20 capsule; Refill: 0  - erythromycin (ROMYCIN) ophthalmic ointment; Place 1 Application into both eyes At Bedtime  Dispense: 1 Tube; Refill: 0  - trimethoprim-polymyxin b (POLYTRIM) ophthalmic solution; Apply 1 drop to eye 3 times daily for 7 days  Dispense: 10 mL; Refill: 0        Javi Clements PA-C  [facility]      Physical Exam

## 2018-02-24 ASSESSMENT — ASTHMA QUESTIONNAIRES: ACT_TOTALSCORE: 25

## 2018-03-23 ENCOUNTER — OFFICE VISIT (OUTPATIENT)
Dept: FAMILY MEDICINE | Facility: CLINIC | Age: 38
End: 2018-03-23
Payer: COMMERCIAL

## 2018-03-23 VITALS
BODY MASS INDEX: 21.8 KG/M2 | HEART RATE: 85 BPM | TEMPERATURE: 98.1 F | DIASTOLIC BLOOD PRESSURE: 70 MMHG | SYSTOLIC BLOOD PRESSURE: 108 MMHG | WEIGHT: 129 LBS | RESPIRATION RATE: 16 BRPM

## 2018-03-23 DIAGNOSIS — H00.019 HORDEOLUM EXTERNUM, UNSPECIFIED LATERALITY: Primary | ICD-10-CM

## 2018-03-23 PROCEDURE — 99213 OFFICE O/P EST LOW 20 MIN: CPT | Performed by: NURSE PRACTITIONER

## 2018-03-23 RX ORDER — CEPHALEXIN 500 MG/1
CAPSULE ORAL
COMMUNITY
Start: 2018-03-21 | End: 2018-08-02

## 2018-03-23 RX ORDER — ALPRAZOLAM 0.5 MG
TABLET ORAL
COMMUNITY
Start: 2018-03-20

## 2018-03-23 ASSESSMENT — ANXIETY QUESTIONNAIRES
7. FEELING AFRAID AS IF SOMETHING AWFUL MIGHT HAPPEN: NOT AT ALL
GAD7 TOTAL SCORE: 0
1. FEELING NERVOUS, ANXIOUS, OR ON EDGE: NOT AT ALL
6. BECOMING EASILY ANNOYED OR IRRITABLE: NOT AT ALL
3. WORRYING TOO MUCH ABOUT DIFFERENT THINGS: NOT AT ALL
2. NOT BEING ABLE TO STOP OR CONTROL WORRYING: NOT AT ALL
5. BEING SO RESTLESS THAT IT IS HARD TO SIT STILL: NOT AT ALL

## 2018-03-23 ASSESSMENT — PATIENT HEALTH QUESTIONNAIRE - PHQ9: 5. POOR APPETITE OR OVEREATING: NOT AT ALL

## 2018-03-23 NOTE — PROGRESS NOTES
HPI    SUBJECTIVE:   Diana Omer is a 37 year old female who presents to clinic today for the following health issues:      Eye(s) Problem      Duration: Started in December. Has been treated twice, but never fully goes away. Eye is now worse.     Description:  Location: bilateral  Pain: YES  Redness: YES  Discharge: YES    Accompanying signs and symptoms: headache and pressure behind eyes    History (Trauma, foreign body exposure,): None    Precipitating or alleviating factors (contact use): None    Therapies tried and outcome: eye ointment, eye drops, doxycycline 2 times and currently taking cephALEXin. Warm compresses.     In February was seen for similar problem.  She was treated with doxy, eye drops and eye ointment.  Symptoms improved, but did not fully resolve.  She was seen by her eye doctor (optomitrist)  2 weeks ago at [facility] and was put on a second round of doxycycline.  She went back last week and antibiotics were switched to keflex.  She has been on keflex for four days.  She now is c/o pain behind her eye.  Reports her eyes hurt and has pain behind the eyes.       Problem list and histories reviewed & adjusted, as indicated.  Additional history: as documented    Current Outpatient Prescriptions   Medication Sig Dispense Refill     cephALEXin (KEFLEX) 500 MG capsule        ALPRAZolam (XANAX) 0.5 MG tablet        erythromycin (ROMYCIN) ophthalmic ointment Place 1 Application into both eyes At Bedtime 1 Tube 0     albuterol (PROAIR HFA/PROVENTIL HFA/VENTOLIN HFA) 108 (90 BASE) MCG/ACT Inhaler Inhale 2 puffs into the lungs every 6 hours as needed for shortness of breath / dyspnea or wheezing 1 Inhaler 0     Ascorbic Acid (VITAMIN C PO) Take 500 mg by mouth       FLUoxetine HCl (PROZAC PO) Take 10 mg by mouth        MULTI FOR HER OR TABS 1 TABLET DAILY       VITAMIN D (ERGOCALCIFEROL) OR None Entered       No Known Allergies    Reviewed and updated as needed this visit by  clinical staff  Tobacco  Allergies  Problems  Med Hx  Soc Hx      Reviewed and updated as needed this visit by Provider         ROS:  Constitutional, HEENT, cardiovascular, pulmonary, gi and gu systems are negative, except as otherwise noted.    OBJECTIVE:     /70 (BP Location: Right arm, Cuff Size: Adult Regular)  Pulse 85  Temp 98.1  F (36.7  C) (Oral)  Resp 16  Wt 129 lb (58.5 kg)  BMI 21.8 kg/m2  Body mass index is 21.8 kg/(m^2).  GENERAL: healthy, alert and no distress  EYES: PERRL, EOMI, conjunctivae and sclerae normal and eyelids-small stye on R and L lower lid margin, along the R upper lid margin there is a large stye, tender and erythematous, no orbital swelling or redness  HENT: ear canals and TM's normal, nose and mouth without ulcers or lesions  NECK: no adenopathy, no asymmetry, masses, or scars and thyroid normal to palpation  PSYCH: tearful, appears frustrated     Diagnostic Test Results:  none     ASSESSMENT/PLAN:   1. Hordeolum externum, unspecified laterality  Ongoing issue; on her third round of antibiotics without improvement.  She has seen her optometrist twice in the last two weeks.  She is frustrated; does not want to go back to them.  Appointment made with [facility] for this afternoon.  She is to start jury duty, but can't given this issue and medical appointments.  Note given to exclude her from jury duty.    - OPHTHALMOLOGY ADULT REFERRAL        DAVID Shanks [facility]      [facility]INT[facility]      Physical Exam

## 2018-03-23 NOTE — MR AVS SNAPSHOT
After Visit Summary   3/23/2018    Diana Omer    MRN: [id number]           Patient Information     Date Of Birth          1980        Visit Information        Provider Department      3/23/2018 10:00 AM Rosalie Delgado APRN CNP [facility]        Today's Diagnoses     Hordeolum externum, unspecified laterality    -  1       Follow-ups after your visit        Additional Services     OPHTHALMOLOGY ADULT REFERRAL       Your provider has referred you to: N: [facility]  [place]  [phone]  [email or web address]    Please be aware that coverage of these services is subject to the terms and limitations of your health insurance plan.  Call member services at your health plan with any benefit or coverage questions.      Please bring the following with you to your appointment:    (1) Any X-Rays, CTs or MRIs which have been performed.  Contact the facility where they were done to arrange for  prior to your scheduled appointment.    (2) List of current medications  (3) This referral request   (4) Any documents/labs given to you for this referral                  Who to contact     If you have questions or need follow up information about today's clinic visit or your schedule please contact [facility] directly at [phone].  Normal or non-critical lab and imaging results will be communicated to you by MyChart, letter or phone within 4 business days after the clinic has received the results. If you do not hear from us within 7 days, please contact the clinic through MyChart or phone. If you have a critical or abnormal lab result, we will notify you by phone as soon as possible.  Submit refill requests through [employer] or call your pharmacy and they will forward the refill request to us. Please allow 3 business days for your refill to be completed.          Additional Information About Your Visit        MyChart  Information     [employer] gives you secure access to your electronic health record. If you see a primary care provider, you can also send messages to your care team and make appointments. If you have questions, please call your primary care clinic.  If you do not have a primary care provider, please call [phone] and they will assist you.        Care EveryWhere ID     This is your Care EveryWhere ID. This could be used by other organizations to access your [facility] medical records  [id number]        Your Vitals Were     Pulse Temperature Respirations BMI (Body Mass Index)          85 98.1  F (36.7  C) (Oral) 16 21.8 kg/m2         Blood Pressure from Last 3 Encounters:   03/23/18 108/70   02/23/18 106/60   11/09/17 104/78    Weight from Last 3 Encounters:   03/23/18 129 lb (58.5 kg)   02/23/18 128 lb 12.8 oz (58.4 kg)   11/09/17 126 lb 1.6 oz (57.2 kg)              We Performed the Following     OPHTHALMOLOGY ADULT REFERRAL        Primary Care Provider Office Phone # Fax #    David Alistair Poole [phone]       [address]  [facility] [address]        Equal Access to Services     GRIFFIN WILLIS AH: Hadii narcisa ku hadasho Soomaali, waaxda luqadaha, qaybta kaalmada adeegyada, chris schafer. So [facility] [phone].    ATENCIÓN: Si habla español, tiene a olivia disposición servicios gratuitos de asistencia lingüística. Qianaame al [phone].    We comply with applicable federal civil rights laws and [place] laws. We do not discriminate on the basis of race, color, national origin, age, disability, sex, sexual orientation, or gender identity.            Thank you!     Thank you for choosing [facility]  for your care. Our goal is always to provide you with excellent care. Hearing back from our patients is one way we can continue to improve our services. Please take a few minutes to complete the written survey that you may receive in the mail after your  visit with us. Thank you!             Your Updated Medication List - Protect others around you: Learn how to safely use, store and throw away your medicines at www.disposemymeds.org.          This list is accurate as of 3/23/18 11:02 AM.  Always use your most recent med list.                   Brand Name Dispense Instructions for use Diagnosis    albuterol 108 (90 BASE) MCG/ACT Inhaler    PROAIR HFA/PROVENTIL HFA/VENTOLIN HFA    1 Inhaler    Inhale 2 puffs into the lungs every 6 hours as needed for shortness of breath / dyspnea or wheezing    Exercise-induced asthma       ALPRAZolam 0.5 MG tablet    XANAX          cephALEXin 500 MG capsule    KEFLEX          erythromycin ophthalmic ointment    ROMYCIN    1 Tube    Place 1 Application into both eyes At Bedtime    Hordeolum, unspecified hordeolum type, unspecified laterality       MULTI FOR HER Tabs      1 TABLET DAILY    Knee pain       PROZAC PO      Take 10 mg by mouth        VITAMIN C PO      Take 500 mg by mouth        VITAMIN D (ERGOCALCIFEROL) PO      None Entered    Knee pain

## 2018-03-23 NOTE — LETTER
[facility]  [address], [address]  [facility] [address]  Phone: [phone]  Fax: [phone]    March 23, 2018        Diana Omer  [address] [facility] [address]          To whom it may concern:    RE: Diana Lovepilar    Ms. Omer is under medical care for an eye condition.  Please excuse Ms. Omer from jury duty at this time.          Sincerely,        DAVID Shanks CNP

## 2018-03-24 ASSESSMENT — ANXIETY QUESTIONNAIRES: GAD7 TOTAL SCORE: 0

## 2018-03-24 ASSESSMENT — PATIENT HEALTH QUESTIONNAIRE - PHQ9: SUM OF ALL RESPONSES TO PHQ QUESTIONS 1-9: 2

## 2018-03-26 ENCOUNTER — APPOINTMENT (OUTPATIENT)
Dept: CT IMAGING | Facility: CLINIC | Age: 38
End: 2018-03-26
Attending: EMERGENCY MEDICINE
Payer: COMMERCIAL

## 2018-03-26 ENCOUNTER — HOSPITAL ENCOUNTER (EMERGENCY)
Facility: CLINIC | Age: 38
Discharge: HOME | End: 2018-03-26
Attending: EMERGENCY MEDICINE | Admitting: EMERGENCY MEDICINE
Payer: COMMERCIAL

## 2018-03-26 VITALS
RESPIRATION RATE: 16 BRPM | SYSTOLIC BLOOD PRESSURE: 108 MMHG | BODY MASS INDEX: 21.12 KG/M2 | TEMPERATURE: 98.1 F | DIASTOLIC BLOOD PRESSURE: 65 MMHG | WEIGHT: 125 LBS | OXYGEN SATURATION: 97 %

## 2018-03-26 DIAGNOSIS — H00.019 HORDEOLUM EXTERNUM, UNSPECIFIED LATERALITY: ICD-10-CM

## 2018-03-26 DIAGNOSIS — H53.8 BLURRED VISION: ICD-10-CM

## 2018-03-26 DIAGNOSIS — G44.89 OTHER HEADACHE SYNDROME: ICD-10-CM

## 2018-03-26 PROCEDURE — 96374 THER/PROPH/DIAG INJ IV PUSH: CPT | Mod: 59

## 2018-03-26 PROCEDURE — 96375 TX/PRO/DX INJ NEW DRUG ADDON: CPT

## 2018-03-26 PROCEDURE — 25000128 H RX IP 250 OP 636: Performed by: EMERGENCY MEDICINE

## 2018-03-26 PROCEDURE — 99285 EMERGENCY DEPT VISIT HI MDM: CPT | Mod: 25

## 2018-03-26 PROCEDURE — 70487 CT MAXILLOFACIAL W/DYE: CPT

## 2018-03-26 RX ORDER — TETRACAINE HYDROCHLORIDE 5 MG/ML
2 SOLUTION OPHTHALMIC ONCE
Status: DISCONTINUED | OUTPATIENT
Start: 2018-03-26 | End: 2018-03-26 | Stop reason: HOSPADM

## 2018-03-26 RX ORDER — METOCLOPRAMIDE HYDROCHLORIDE 5 MG/ML
5 INJECTION INTRAMUSCULAR; INTRAVENOUS ONCE
Status: COMPLETED | OUTPATIENT
Start: 2018-03-26 | End: 2018-03-26

## 2018-03-26 RX ORDER — HYDROCODONE BITARTRATE AND ACETAMINOPHEN 5; 325 MG/1; MG/1
1 TABLET ORAL EVERY 6 HOURS PRN
Qty: 15 TABLET | Refills: 0 | Status: SHIPPED | OUTPATIENT
Start: 2018-03-26 | End: 2018-08-02

## 2018-03-26 RX ORDER — KETOROLAC TROMETHAMINE 30 MG/ML
30 INJECTION, SOLUTION INTRAMUSCULAR; INTRAVENOUS ONCE
Status: COMPLETED | OUTPATIENT
Start: 2018-03-26 | End: 2018-03-26

## 2018-03-26 RX ORDER — IOPAMIDOL 755 MG/ML
500 INJECTION, SOLUTION INTRAVASCULAR ONCE
Status: COMPLETED | OUTPATIENT
Start: 2018-03-26 | End: 2018-03-26

## 2018-03-26 RX ORDER — DIPHENHYDRAMINE HYDROCHLORIDE 50 MG/ML
12.5 INJECTION INTRAMUSCULAR; INTRAVENOUS ONCE
Status: COMPLETED | OUTPATIENT
Start: 2018-03-26 | End: 2018-03-26

## 2018-03-26 RX ADMIN — SODIUM CHLORIDE 65 ML: 9 INJECTION, SOLUTION INTRAVENOUS at 17:11

## 2018-03-26 RX ADMIN — KETOROLAC TROMETHAMINE 30 MG: 30 INJECTION, SOLUTION INTRAMUSCULAR at 16:42

## 2018-03-26 RX ADMIN — IOPAMIDOL 80 ML: 755 INJECTION, SOLUTION INTRAVENOUS at 17:10

## 2018-03-26 RX ADMIN — DIPHENHYDRAMINE HYDROCHLORIDE 12.5 MG: 50 INJECTION, SOLUTION INTRAMUSCULAR; INTRAVENOUS at 18:21

## 2018-03-26 RX ADMIN — METOCLOPRAMIDE 5 MG: 5 INJECTION, SOLUTION INTRAMUSCULAR; INTRAVENOUS at 18:20

## 2018-03-26 ASSESSMENT — VISUAL ACUITY
OS: 20/20
OD: 20/20

## 2018-03-26 ASSESSMENT — ENCOUNTER SYMPTOMS
RHINORRHEA: 0
FEVER: 0
EYE PAIN: 1

## 2018-03-26 NOTE — ED AVS SNAPSHOT
[facility] Emergency Department    [address]    [facility] [address]    Phone:  [phone]    Fax:  [phone]                                       Diana Omer   MRN: [id number]    Department:  [facility] Emergency Department   Date of Visit:  3/26/2018           After Visit Summary Signature Page     I have received my discharge instructions, and my questions have been answered. I have discussed any challenges I see with this plan with the nurse or doctor.    ..........................................................................................................................................  Patient/Patient Representative Signature      ..........................................................................................................................................  Patient Representative Print Name and Relationship to Patient    ..................................................               ................................................  Date                                            Time    ..........................................................................................................................................  Reviewed by Signature/Title    ...................................................              ..............................................  Date                                                            Time

## 2018-03-26 NOTE — ED PROVIDER NOTES
"  History     Chief Complaint:  Eye Pain and Blurred Vision     HPI   Diana Omer is an otherwise healthy 37 year old female who presents to the emergency department today for evaluation of eye pain and blurred vision.The patient states that in December she was diagnosed with styes in her bilateral eyes and put on 2 rounds of Doxycyline pills, Polymycin eye drops, and Erythromycin ointment by by her family ophthalmologist, and was referred to [facility] for possible drainage of one stye, but it started spontaneously draining. The patient was not experiencing relief with this so she was switched to Keflex. She notes that she is on day 5 of this.The patient states that she has 2 styes in her right eye and 2 styes in her left eye. She states that she has been experiencing swelling and pressure in her head behind her eyes and eye pain that she describes feeling like \"glass in her eyeballs\". The patient denies any complete loss of vision, though yesterday she had an episode of bilateral blurred vision. She states that after 30 minutes the vision in her right eye improved. She states that it improved somewhat in her left eye, but never came completely back to normal. She denies any fevers, runny nose, rash, or vaginal discharge. She did have a sun rash with the first round of Doxycyline with a fever for approximately 3 days on month ago. No recent infections, exotic travel, history of STI, daily eye makeup use, or IV drug use. The patient states that she is a contact wearer and changes her contacts every 2 weeks. She staes that she threw away all of her makeup after she was diagnosed with styes.     The patient states that she does not want to get any further care from [facility], and she wishes to get any further care from [facility].     Allergies:  No Known Drug Allergies     Medications:    cephALEXin (KEFLEX) 500 MG capsule  ALPRAZolam (XANAX) 0.5 MG tablet  erythromycin (ROMYCIN) ophthalmic ointment  albuterol " (PROAIR HFA/PROVENTIL HFA/VENTOLIN HFA) 108 (90 BASE) MCG/ACT Inhaler  Ascorbic Acid (VITAMIN C PO)  FLUoxetine HCl (PROZAC PO)  VITAMIN D    Past Medical History:    Depression  Benign liver tumor  Asthma     Past Surgical History:    Hysterectomy    Family History:    Mother positive for skin cancer and anxiety  Father postive for anxiety and hypertension  Maternal grandmother positive for diabetes and cerebrovascular disease  Paternal grandmother positive for diabetes  Maternal grandfather positive for prostate cancer     Social History:  Smoking Status: former smoker  Smokeless Tobacco: negative  Alcohol Use: negative  Marital Status:   [2]     Review of Systems   Constitutional: Negative for fever.   HENT: Negative for rhinorrhea.    Eyes: Positive for pain and visual disturbance.   Genitourinary: Negative for vaginal discharge.   Skin: Negative for rash.   All other systems reviewed and are negative.      Physical Exam     Patient Vitals for the past 24 hrs:   BP SpO2   03/26/18 1845 - 97 %   03/26/18 1830 108/65 100 %   03/26/18 1822 113/76 98 %     Physical Exam  Constitutional:  Well developed, Well nourished, comfortable appearing.    HENT:  Bilateral external ears normal, Mucous membranes moist, Nose normal. Neck- Normal range of motion, Supple, TMs unremarkable. 1 cm firm, tender, area anterior to the right ear. No other pre or post auricular lymphadenopathy, no cervical lymphadenopathy, posterior pharynx is unremarkable, no significant sinus tenderness to percussion.   Eyes: PERRL, EOMI, conjunctivae unremarkable  Respiratory:  Normal breath sounds, No respiratory distress, No wheezing,  Cardiovascular:  Normal heart rate, Normal rhythm, No murmurs,    GI:  Bowel sounds normal, Soft, No tenderness,   Musculoskeletal:  Intact distal pulses, No edema, grossly unremarkable range of motion   Integument:  Warm, Dry   Neurological: Alert, attentive and oriented to person, place and time  Cranial nerves  2-12 intact;   5/5 strength throughout the upper and lower extremities;   Sensation intact to light touch throughout the upper and lower extremities;   Psychiatric:  Mood and affect normal.     Slit Lamp Exam:   Eye: EOMI, PERRL. 4 styes, 1 each on upper lid, and one each on lower lid. No active drainage, no other eye drainage, no conjunctival injection.    Emergency Department Course   Imaging:  Radiographic findings were communicated with the patient who voiced understanding of the findings.    CT Maxillofacial w Contrast:  Normal facial bone CT without significant inflammation or  fluid collection identified. As per radiology.    Procedure Note:   Slit Lamp Examination  Performed by:  Dr. Vikki Roberson  Indication:  Eye pain/discomfort    Tetracaine drops were used to anesthetize the affected eye.  Fluorescein staining was performed.  The eye was inspected under white and blue light, at low and high magnification    Findings:  Eyelids:  Styes as above  Cornea:  Normal  Iris:   Normal  Conjunctiva:  Normal  Anterior Chamber: Normal  Pupils:   Normal    Impression:     Unremarkable.     Bilateral pressure testing was done using [employer]. This was unremarkable bilaterally.     Interventions:  Fluorescein 0.36 mg, bilateral eyes  Tetracaine 0.5 %, bilateral eyes, 2 drops   1642 Toradol 30 mg IV    Emergency Department Course:  Nursing notes and vitals reviewed. I performed an exam of the patient as documented above.     VISUALACUITY: The patient's visual acuity was checked here in the emergency department and found to be 20/20 in the right eye and 20/20 in the left eye. Pt states this is different than normal.  The patient was sent for the following imaging studies while in the emergency department: CT Maxillofacial w Contrast.    1630 Slit lamp eye examination and pressure testing was performed as noted above.     1732 I consulted with Radiology who informed me of the patient's negative CT.     1734 I reevaluated the  patient and provided an update in regards to her ED course.      1741 I consulted with Dr. Ruby of [facility], no further recommendations, will see her tomorrow.    Findings and plan explained to the Patient. Patient discharged home with instructions regarding supportive care, medications, and reasons to return. The importance of close follow-up was reviewed.    Impression & Plan    Medical Decision Making:  Diana Omer is a 37 year old female who presents with complaints of decreased visual acuity bilateral eyes, headache and right-sided temporal pain/mass.  She has had multiple styes and is unclear how this might be related.  Extensive workup here for worrisome causes of her headache and associated symptoms, including periorbital infection, localized sinus infection, is unremarkable. Neuro exam is unremarkable and features do not suggest worrisome cause of headache including meningitis, traumatic injury or subarachnoid hemorrhage.  Her eye exam shows no corneal ulcerations, abrasions, or other lesions.  No acute appearing abnormalities but given her subjective change in vision I did recommend she be seen by an ophthalmologist closely, and she will do so.  Comfortable with plan of discharge.    Diagnosis:    ICD-10-CM    1. Blurred vision H53.8    2. Other headache syndrome G44.89    3. Hordeolum externum, unspecified laterality H00.019        Disposition:  Discharged to home.     Discharge Medications:  Discharge Medication List as of 3/26/2018  6:45 PM      START taking these medications    Details   HYDROcodone-acetaminophen (NORCO) 5-325 MG per tablet Take 1 tablet by mouth every 6 hours as needed, Disp-15 tablet, R-0, Local Print           Scribe Disclosure:  I, Lula Dao, am serving as a scribe on 3/26/2018 at 2:40 PM to personally document services performed by Vikki Roberson MD based on my observations and the provider's statements to me.     Lula Dao  3/26/2018   [facility]  [facility] EMERGENCY DEPARTMENT       Vikki Roberson MD  03/27/18 2634

## 2018-03-26 NOTE — ED AVS SNAPSHOT
[facility] Emergency Department    [address] [place] [address]    Phone:  [phone]    Fax:  [phone]                                       Diana Omer   MRN: [id number]    Department:  [facility] Emergency Department   Date of Visit:  3/26/2018           Patient Information     Date Of Birth          1980        Your diagnoses for this visit were:     Blurred vision     Other headache syndrome     Hordeolum externum, unspecified laterality        You were seen by Vikki Roberson MD.        Discharge Instructions       Follow up with [facility] tomorrow as discussed.    Sty (or Stye)  A sty is an infection of the oil gland of the eyelid. It may develop into a small pocket of pus (an abscess). This can cause pain, redness, and swelling. In early stages, a sty is treated with antibiotic cream, eye drops, or a small towel soaked in warm water (a warm compress). More severe cases may need to be opened and drained by a healthcare provider.  Home care    Eye drops or ointment are usually prescribed to treat the infection. Use these as directed.     Artificial tears may also be used to lubricate the eye and make it more comfortable. You can buy these over the counter without a prescription. Talk with your healthcare provider before using any over-the-counter treatment for a sty.    Apply a warm, damp towel to the affected eye for at least 5 minutes, 3 to 4 times a day for a week. Warm compresses open the pores and speed the healing. But if the compresses are too hot, they may burn your eyelid.    Sometimes the sty will drain with this treatment alone. If this happens, keep using the antibiotic until all the redness and swelling are gone.    Wash your hands before and after touching the infected eyelid to avoid spreading the infection.    Don t squeeze or try to break open the sty.  Follow-up care  Follow up with your healthcare provider, or as advised.   When  to seek medical advice  Call your healthcare provider right away if any of these occur:    Increase in swelling or redness around the eyelid after 48 to 72 hours    Increase in eye pain or the eyelid blisters    Increase in warmth--the eyelid feels hot    Drainage of blood or thick pus from the sty    Blister on the eyelid    Inability to open the eyelid due to swelling    Fever of 100.4 F (38 C) or above, or as directed by your provider    Vision changes    Headache or stiff neck    The sty comes back  Date Last Reviewed: 8/1/2017 2000-2017 [employer]. [address] [place] [address]. All rights reserved. This information is not intended as a substitute for professional medical care. Always follow your healthcare professional's instructions.    Discharge Instructions  Headache    You were seen today for a headache. Headaches may be caused by many different things such as muscle tension, sinus inflammation, anxiety and stress, having too little sleep, too much alcohol, some medical conditions or injury. You may have a migraine, which is caused by changes in the blood vessels in your head.  At this time your provider does not find that your headache is a sign of anything dangerous or life-threatening.  However, sometimes the signs of serious illness do not show up right away.      Generally, every Emergency Department visit should have a follow-up clinic visit with either a primary or a specialty clinic/provider. Please follow-up as instructed by your emergency provider today.    Return to the Emergency Department if:    You get a new fever of 100.4 F or higher.    Your headache gets much worse.    You get a stiff neck with your headache.    You get a new headache that is significantly different or worse than headaches you have had before.    You are vomiting (throwing up) and cannot keep food or water down.    You have blurry or double vision or other problems with your eyes.    You have  a new weakness on one side of your body.    You have difficulty with balance which is new.    You or your family thinks you are confused.    You have a seizure.    What can I do to help myself?    Pain medications - You may take a pain medication such as Tylenol  (acetaminophen), Advil , Motrin  (ibuprofen) or Aleve  (naproxen).    Take a pain reliever as soon as you notice symptoms.  Starting medications as soon as you start to have symptoms may lessen the amount of pain you have.    Relaxing in a quiet, dark room may help.    Get enough sleep and eat meals regularly.    You may need to watch for certain foods or other things which may trigger your headaches.  Keeping a journal of your headaches and possible triggers may help you and your primary provider to identify things which you should avoid which may be causing your headaches.  If you were given a prescription for medicine here today, be sure to read all of the information (including the package insert) that comes with your prescription.  This will include important information about the medicine, its side effects, and any warnings that you need to know about.  The pharmacist who fills the prescription can provide more information and answer questions you may have about the medicine.  If you have questions or concerns that the pharmacist cannot address, please call or return to the Emergency Department.   Remember that you can always come back to the Emergency Department if you are not able to see your regular provider in the amount of time listed above, if you get any new symptoms, or if there is anything that worries you.        24 Hour Appointment Hotline       To make an appointment at any [facility], call [phone] ([phone]). If you don't have a family doctor or clinic, we will help you find one. [facility] are conveniently located to serve the needs of you and your family.             Review of your medicines      START taking         Dose / Directions Last dose taken    HYDROcodone-acetaminophen 5-325 MG per tablet   Commonly known as:  NORCO   Dose:  1 tablet   Quantity:  15 tablet        Take 1 tablet by mouth every 6 hours as needed   Refills:  0          Our records show that you are taking the medicines listed below. If these are incorrect, please call your family doctor or clinic.        Dose / Directions Last dose taken    albuterol 108 (90 BASE) MCG/ACT Inhaler   Commonly known as:  PROAIR HFA/PROVENTIL HFA/VENTOLIN HFA   Dose:  2 puff   Quantity:  1 Inhaler        Inhale 2 puffs into the lungs every 6 hours as needed for shortness of breath / dyspnea or wheezing   Refills:  0        ALPRAZolam 0.5 MG tablet   Commonly known as:  XANAX        Refills:  0        cephALEXin 500 MG capsule   Commonly known as:  KEFLEX        Refills:  0        erythromycin ophthalmic ointment   Commonly known as:  ROMYCIN   Dose:  1 Application   Quantity:  1 Tube        Place 1 Application into both eyes At Bedtime   Refills:  0        MULTI FOR HER Tabs        1 TABLET DAILY   Refills:  0        PROZAC PO   Dose:  10 mg        Take 10 mg by mouth   Refills:  0        VITAMIN C PO   Dose:  500 mg        Take 500 mg by mouth   Refills:  0        VITAMIN D (ERGOCALCIFEROL) PO        None Entered   Refills:  0                Prescriptions were sent or printed at these locations (1 Prescription)                   Other Prescriptions                Printed at Department/Unit printer (1 of 1)         HYDROcodone-acetaminophen (NORCO) 5-325 MG per tablet                Procedures and tests performed during your visit     CT Maxillofacial w Contrast    Peripheral IV catheter      Orders Needing Specimen Collection     None      Pending Results     No orders found from 3/24/2018 to 3/27/2018.            Pending Culture Results     No orders found from 3/24/2018 to 3/27/2018.            Pending Results Instructions     If you had any lab results that were not  finalized at the time of your Discharge, you can call the ED Lab Result RN at [phone]. You will be contacted by this team for any positive Lab results or changes in treatment. The nurses are available 7 days a week from 10A to 6:30P.  You can leave a message 24 hours per day and they will return your call.        Test Results From Your Hospital Stay        3/26/2018  5:55 PM      Narrative     CT SCAN OF THE FACE WITH CONTRAST  3/26/2018  5:21 PM     HISTORY: Face pain, greater on right, eye pain.    TECHNIQUE:  Axial scans of the face following intravenous contrast  with sagittal and coronal reformations. Radiation dose for this scan  was reduced using automated exposure control, adjustment of the mA  and/or kV according to patient size, or iterative reconstruction  technique. 80mL [id number]-[id number]    COMPARISON: None.    FINDINGS: No abnormal inflammation is seen within the fascial tissues.  The orbital fat is clear. Rectus muscles appear symmetric. Lacrimal  glands are unremarkable. The submandibular and parotid glands have a  normal appearance on CT without ductal dilatation or stone identified.    Paranasal sinuses are clear. No suspicious osseous lesions. No  periapical lucency or significant carious lesion is identified.    No suspicious masses within the visualized portions of the neck.        Impression     IMPRESSION: Normal facial bone CT without significant inflammation or  fluid collection identified.    NIA PULLIAM MD                Clinical Quality Measure: Blood Pressure Screening     Your blood pressure was checked while you were in the emergency department today. The last reading we obtained was  BP: 108/65 . Please read the guidelines below about what these numbers mean and what you should do about them.  If your systolic blood pressure (the top number) is less than 120 and your diastolic blood pressure (the bottom number) is less than 80, then your blood pressure is normal. There is nothing more  that you need to do about it.  If your systolic blood pressure (the top number) is 120-139 or your diastolic blood pressure (the bottom number) is 80-89, your blood pressure may be higher than it should be. You should have your blood pressure rechecked within a year by a primary care provider.  If your systolic blood pressure (the top number) is 140 or greater or your diastolic blood pressure (the bottom number) is 90 or greater, you may have high blood pressure. High blood pressure is treatable, but if left untreated over time it can put you at risk for heart attack, stroke, or kidney failure. You should have your blood pressure rechecked by a primary care provider within the next 4 weeks.  If your provider in the emergency department today gave you specific instructions to follow-up with your doctor or provider even sooner than that, you should follow that instruction and not wait for up to 4 weeks for your follow-up visit.        Thank you for choosing [facility]       Thank you for choosing [facility] for your care. Our goal is always to provide you with excellent care. Hearing back from our patients is one way we can continue to improve our services. Please take a few minutes to complete the written survey that you may receive in the mail after you visit with us. Thank you!        [facility] Information     [facility] gives you secure access to your electronic health record. If you see a primary care provider, you can also send messages to your care team and make appointments. If you have questions, please call your primary care clinic.  If you do not have a primary care provider, please call [phone] and they will assist you.        Care EveryWhere ID     This is your Care EveryWhere ID. This could be used by other organizations to access your [facility] medical records  [id number]        Equal Access to Services     GRIFFIN WILLIS AH: patricia Lobato qaybta kaalmada adeegyada, waxay  tennille iyer ah. So [facility] [phone].    ATENCIÓN: Si habla español, tiene a olivia disposición servicios gratuitos de asistencia lingüística. Llame al [phone].    We comply with applicable federal civil rights laws and [place] laws. We do not discriminate on the basis of race, color, national origin, age, disability, sex, sexual orientation, or gender identity.            After Visit Summary       This is your record. Keep this with you and show to your community pharmacist(s) and doctor(s) at your next visit.

## 2018-03-26 NOTE — DISCHARGE INSTRUCTIONS
Follow up with [facility] tomorrow as discussed.    Sty (or Stye)  A sty is an infection of the oil gland of the eyelid. It may develop into a small pocket of pus (an abscess). This can cause pain, redness, and swelling. In early stages, a sty is treated with antibiotic cream, eye drops, or a small towel soaked in warm water (a warm compress). More severe cases may need to be opened and drained by a healthcare provider.  Home care    Eye drops or ointment are usually prescribed to treat the infection. Use these as directed.     Artificial tears may also be used to lubricate the eye and make it more comfortable. You can buy these over the counter without a prescription. Talk with your healthcare provider before using any over-the-counter treatment for a sty.    Apply a warm, damp towel to the affected eye for at least 5 minutes, 3 to 4 times a day for a week. Warm compresses open the pores and speed the healing. But if the compresses are too hot, they may burn your eyelid.    Sometimes the sty will drain with this treatment alone. If this happens, keep using the antibiotic until all the redness and swelling are gone.    Wash your hands before and after touching the infected eyelid to avoid spreading the infection.    Don t squeeze or try to break open the sty.  Follow-up care  Follow up with your healthcare provider, or as advised.   When to seek medical advice  Call your healthcare provider right away if any of these occur:    Increase in swelling or redness around the eyelid after 48 to 72 hours    Increase in eye pain or the eyelid blisters    Increase in warmth--the eyelid feels hot    Drainage of blood or thick pus from the sty    Blister on the eyelid    Inability to open the eyelid due to swelling    Fever of 100.4 F (38 C) or above, or as directed by your provider    Vision changes    Headache or stiff neck    The sty comes back  Date Last Reviewed: 8/1/2017 2000-2017 [employer]. [address] [place] [address]  [place] [address]. All rights reserved. This information is not intended as a substitute for professional medical care. Always follow your healthcare professional's instructions.    Discharge Instructions  Headache    You were seen today for a headache. Headaches may be caused by many different things such as muscle tension, sinus inflammation, anxiety and stress, having too little sleep, too much alcohol, some medical conditions or injury. You may have a migraine, which is caused by changes in the blood vessels in your head.  At this time your provider does not find that your headache is a sign of anything dangerous or life-threatening.  However, sometimes the signs of serious illness do not show up right away.      Generally, every Emergency Department visit should have a follow-up clinic visit with either a primary or a specialty clinic/provider. Please follow-up as instructed by your emergency provider today.    Return to the Emergency Department if:    You get a new fever of 100.4 F or higher.    Your headache gets much worse.    You get a stiff neck with your headache.    You get a new headache that is significantly different or worse than headaches you have had before.    You are vomiting (throwing up) and cannot keep food or water down.    You have blurry or double vision or other problems with your eyes.    You have a new weakness on one side of your body.    You have difficulty with balance which is new.    You or your family thinks you are confused.    You have a seizure.    What can I do to help myself?    Pain medications - You may take a pain medication such as Tylenol  (acetaminophen), Advil , Motrin  (ibuprofen) or Aleve  (naproxen).    Take a pain reliever as soon as you notice symptoms.  Starting medications as soon as you start to have symptoms may lessen the amount of pain you have.    Relaxing in a quiet, dark room may help.    Get enough sleep and eat meals regularly.    You may need  to watch for certain foods or other things which may trigger your headaches.  Keeping a journal of your headaches and possible triggers may help you and your primary provider to identify things which you should avoid which may be causing your headaches.  If you were given a prescription for medicine here today, be sure to read all of the information (including the package insert) that comes with your prescription.  This will include important information about the medicine, its side effects, and any warnings that you need to know about.  The pharmacist who fills the prescription can provide more information and answer questions you may have about the medicine.  If you have questions or concerns that the pharmacist cannot address, please call or return to the Emergency Department.   Remember that you can always come back to the Emergency Department if you are not able to see your regular provider in the amount of time listed above, if you get any new symptoms, or if there is anything that worries you.

## 2018-04-30 ENCOUNTER — OFFICE VISIT (OUTPATIENT)
Dept: FAMILY MEDICINE | Facility: CLINIC | Age: 38
End: 2018-04-30
Payer: COMMERCIAL

## 2018-04-30 VITALS
HEART RATE: 84 BPM | WEIGHT: 127 LBS | BODY MASS INDEX: 21.46 KG/M2 | TEMPERATURE: 98.1 F | SYSTOLIC BLOOD PRESSURE: 98 MMHG | RESPIRATION RATE: 16 BRPM | DIASTOLIC BLOOD PRESSURE: 60 MMHG

## 2018-04-30 DIAGNOSIS — H04.123 DRY EYES: ICD-10-CM

## 2018-04-30 DIAGNOSIS — H00.019 HORDEOLUM, UNSPECIFIED HORDEOLUM TYPE, UNSPECIFIED LATERALITY: Primary | ICD-10-CM

## 2018-04-30 LAB
BASOPHILS # BLD AUTO: 0 10E9/L (ref 0–0.2)
BASOPHILS NFR BLD AUTO: 0.2 %
CRP SERPL-MCNC: <2.9 MG/L (ref 0–8)
DIFFERENTIAL METHOD BLD: NORMAL
EOSINOPHIL # BLD AUTO: 0.2 10E9/L (ref 0–0.7)
EOSINOPHIL NFR BLD AUTO: 2.7 %
ERYTHROCYTE [DISTWIDTH] IN BLOOD BY AUTOMATED COUNT: 13.1 % (ref 10–15)
ERYTHROCYTE [SEDIMENTATION RATE] IN BLOOD BY WESTERGREN METHOD: 7 MM/H (ref 0–20)
HCT VFR BLD AUTO: 40.3 % (ref 35–47)
HGB BLD-MCNC: 13.3 G/DL (ref 11.7–15.7)
HIV 1+2 AB+HIV1 P24 AG SERPL QL IA: NONREACTIVE
LYMPHOCYTES # BLD AUTO: 2.3 10E9/L (ref 0.8–5.3)
LYMPHOCYTES NFR BLD AUTO: 39.2 %
MCH RBC QN AUTO: 28 PG (ref 26.5–33)
MCHC RBC AUTO-ENTMCNC: 33 G/DL (ref 31.5–36.5)
MCV RBC AUTO: 85 FL (ref 78–100)
MONOCYTES # BLD AUTO: 0.6 10E9/L (ref 0–1.3)
MONOCYTES NFR BLD AUTO: 10.3 %
NEUTROPHILS # BLD AUTO: 2.8 10E9/L (ref 1.6–8.3)
NEUTROPHILS NFR BLD AUTO: 47.6 %
PLATELET # BLD AUTO: 241 10E9/L (ref 150–450)
RBC # BLD AUTO: 4.75 10E12/L (ref 3.8–5.2)
WBC # BLD AUTO: 6 10E9/L (ref 4–11)

## 2018-04-30 PROCEDURE — 80048 BASIC METABOLIC PNL TOTAL CA: CPT | Performed by: PHYSICIAN ASSISTANT

## 2018-04-30 PROCEDURE — 87389 HIV-1 AG W/HIV-1&-2 AB AG IA: CPT | Performed by: PHYSICIAN ASSISTANT

## 2018-04-30 PROCEDURE — 86038 ANTINUCLEAR ANTIBODIES: CPT | Performed by: PHYSICIAN ASSISTANT

## 2018-04-30 PROCEDURE — 85025 COMPLETE CBC W/AUTO DIFF WBC: CPT | Performed by: PHYSICIAN ASSISTANT

## 2018-04-30 PROCEDURE — 86140 C-REACTIVE PROTEIN: CPT | Performed by: PHYSICIAN ASSISTANT

## 2018-04-30 PROCEDURE — 84443 ASSAY THYROID STIM HORMONE: CPT | Performed by: PHYSICIAN ASSISTANT

## 2018-04-30 PROCEDURE — 36415 COLL VENOUS BLD VENIPUNCTURE: CPT | Performed by: PHYSICIAN ASSISTANT

## 2018-04-30 PROCEDURE — 85652 RBC SED RATE AUTOMATED: CPT | Performed by: PHYSICIAN ASSISTANT

## 2018-04-30 PROCEDURE — 99213 OFFICE O/P EST LOW 20 MIN: CPT | Performed by: PHYSICIAN ASSISTANT

## 2018-04-30 RX ORDER — POLYMYXIN B SULFATE AND TRIMETHOPRIM 1; 10000 MG/ML; [USP'U]/ML
1 SOLUTION OPHTHALMIC
COMMUNITY
End: 2018-08-02

## 2018-04-30 RX ORDER — POLYMYXIN B SULFATE AND TRIMETHOPRIM 1; 10000 MG/ML; [USP'U]/ML
1 SOLUTION OPHTHALMIC 3 TIMES DAILY
Qty: 10 ML | Refills: 0 | Status: SHIPPED | OUTPATIENT
Start: 2018-04-30 | End: 2018-08-02

## 2018-04-30 NOTE — LETTER
My Depression Action Plan  Name: Diana Omer   Date of Birth 1980  Date: 4/30/2018    My doctor: David Poole   My clinic: [address], [address]  [facility] [address] [phone]          GREEN    ZONE   Good Control    What it looks like:     Things are going generally well. You have normal up s and down s. You may even feel depressed from time to time, but bad moods usually last less than a day.   What you need to do:  1. Continue to care for yourself (see self care plan)  2. Check your depression survival kit and update it as needed  3. Follow your physician s recommendations including any medication.  4. Do not stop taking medication unless you consult with your physician first.           YELLOW         ZONE Getting Worse    What it looks like:     Depression is starting to interfere with your life.     It may be hard to get out of bed; you may be starting to isolate yourself from others.    Symptoms of depression are starting to last most all day and this has happened for several days.     You may have suicidal thoughts but they are not constant.   What you need to do:     1. Call your care team, your response to treatment will improve if you keep your care team informed of your progress. Yellow periods are signs an adjustment may need to be made.     2. Continue your self-care, even if you have to fake it!    3. Talk to someone in your support network    4. Open up your depression survival kit           RED    ZONE Medical Alert - Get Help    What it looks like:     Depression is seriously interfering with your life.     You may experience these or other symptoms: You can t get out of bed most days, can t work or engage in other necessary activities, you have trouble taking care of basic hygiene, or basic responsibilities, thoughts of suicide or death that will not go away, self-injurious behavior.     What you need to do:  1. Call  your care team and request a same-day appointment. If they are not available (weekends or after hours) call your local crisis line, emergency room or 911.            Depression Self Care Plan / Survival Kit    Self-Care for Depression  Here s the deal. Your body and mind are really not as separate as most people think.  What you do and think affects how you feel and how you feel influences what you do and think. This means if you do things that people who feel good do, it will help you feel better.  Sometimes this is all it takes.  There is also a place for medication and therapy depending on how severe your depression is, so be sure to consult with your medical provider and/ or Behavioral Health Consultant if your symptoms are worsening or not improving.     In order to better manage my stress, I will:    Exercise  Get some form of exercise, every day. This will help reduce pain and release endorphins, the  feel good  chemicals in your brain. This is almost as good as taking antidepressants!  This is not the same as joining a gym and then never going! (they count on that by the way ) It can be as simple as just going for a walk or doing some gardening, anything that will get you moving.      Hygiene   Maintain good hygiene (Get out of bed in the morning, Make your bed, Brush your teeth, Take a shower, and Get dressed like you were going to work, even if you are unemployed).  If your clothes don't fit try to get ones that do.    Diet  I will strive to eat foods that are good for me, drink plenty of water, and avoid excessive sugar, caffeine, alcohol, and other mood-altering substances.  Some foods that are helpful in depression are: complex carbohydrates, B vitamins, flaxseed, fish or fish oil, fresh fruits and vegetables.    Psychotherapy  I agree to participate in Individual Therapy (if recommended).    Medication  If prescribed medications, I agree to take them.  Missing doses can result in serious side effects.   I understand that drinking alcohol, or other illicit drug use, may cause potential side effects.  I will not stop my medication abruptly without first discussing it with my provider.    Staying Connected With Others  I will stay in touch with my friends, family members, and my primary care provider/team.    Use your imagination  Be creative.  We all have a creative side; it doesn t matter if it s oil painting, sand castles, or mud pies! This will also kick up the endorphins.    Witness Beauty  (AKA stop and smell the roses) Take a look outside, even in mid-winter. Notice colors, textures. Watch the squirrels and birds.     Service to others  Be of service to others.  There is always someone else in need.  By helping others we can  get out of ourselves  and remember the really important things.  This also provides opportunities for practicing all the other parts of the program.    Humor  Laugh and be silly!  Adjust your TV habits for less news and crime-drama and more comedy.    Control your stress  Try breathing deep, massage therapy, biofeedback, and meditation. Find time to relax each day.     My support system    Clinic Contact:  Phone number:    Contact 1:  Phone number:    Contact 2:  Phone number:    Sabianist/:  Phone number:    Therapist:  Phone number:    [facility] crisis center:    Phone number:    Other community support:  Phone number:

## 2018-04-30 NOTE — PROGRESS NOTES
SUBJECTIVE:   Diana Omer is a 37 year old female who presents to clinic today for the following health issues:      Eye(s) Problem  Onset: recheck    Description:   Location: bilateral  Pain: YES  Redness: YES    Accompanying Signs & Symptoms:  Discharge/mattering: YES  Swelling: YES  Visual changes: YES  Fever: no  Nasal Congestion: YES-swollen lymph node on right side  Bothered by bright lights: no    History:   Trauma: no   Foreign body exposure: no    Precipitating factors:   Wearing contacts: no    Alleviating factors:  Improved by: Keflex, Polytrim    Therapies Tried and outcome: antibiotics, eye ointments    Diana is here to go over her issues with styes that she has been having for several months.  She thinks she has been to about 10 different specialists at this point.  She just started back on Keflex again just last week and it has been recommended to take this for 20 days and then start Doxy again for 5 months.  She is not sure if she wants to do this however due to concerns over being on ABX for so long.  The styes come and go and sometimes are painful, sometimes not.    A couple have drained on their own spontaneously.  Has an appointment with eye surgeon tomorrow for consult.  [facility] in [place] and [place].  Today she is wondering about getting some blood work done.      Problem list and histories reviewed & adjusted, as indicated.  Additional history: as documented    Labs reviewed in EPIC    Reviewed and updated as needed this visit by clinical staff  Tobacco  Allergies  Meds  Problems  Med Hx  Surg Hx  Fam Hx  Soc Hx        Reviewed and updated as needed this visit by Provider         ROS:  Constitutional, HEENT, cardiovascular, pulmonary, gi and gu systems are negative, except as otherwise noted.    OBJECTIVE:     BP 98/60 (BP Location: Right arm, Patient Position: Sitting, Cuff Size: Adult Regular)  Pulse 84  Temp 98.1  F (36.7  C) (Oral)  Resp 16  Wt 127 lb  (57.6 kg)  BMI 21.46 kg/m2  Body mass index is 21.46 kg/(m^2).  GENERAL: healthy, alert and no distress  EYES: conjunctivae and sclerae normal and eyelids- hordeolum/sty - several located each eye  MS: no gross musculoskeletal defects noted, no edema  SKIN: no suspicious lesions or rashes  PSYCH: mentation appears normal, affect normal/bright    Diagnostic Test Results:  pending    ASSESSMENT/PLAN:   1. Hordeolum, unspecified hordeolum type, unspecified laterality  Refilled drops today, she states that these do seem to keep the symptoms under control a bit when they are being used.  - trimethoprim-polymyxin b (POLYTRIM) ophthalmic solution; Apply 1 drop to eye 3 times daily  Dispense: 10 mL; Refill: 0  - TSH with free T4 reflex  - CBC with platelets differential  - HIV Antigen Antibody Combo    2. Dry eyes  Will order labs today and make available when they are ready.  - TSH with free T4 reflex  - CBC with platelets differential  - HIV Antigen Antibody Combo  - Erythrocyte sedimentation rate auto  - CRP inflammation  - Anti Nuclear Ines IgG by IFA with Reflex  - Basic metabolic panel        Javi Clements PA-C  [facility]

## 2018-04-30 NOTE — LETTER
My Asthma Action Plan  Name: Diana Omer   YOB: 1980  Date: 4/30/2018   My doctor: Javi Clements PA-C   My clinic: [facility]        My Control Medicine: None  My Rescue Medicine: Albuterol (Proair/Ventolin/Proventil) inhaler        My Asthma Severity: exercise-induced asthma  Avoid your asthma triggers:                   GREEN ZONE   Good Control    I feel good    No cough or wheeze    Can work, sleep and play without asthma symptoms       Take your asthma control medicine every day.     1. If exercise triggers your asthma, take your rescue medication    15 minutes before exercise or sports, and    During exercise if you have asthma symptoms  2. Spacer to use with inhaler: If you have a spacer, make sure to use it with your inhaler             YELLOW ZONE Getting Worse  I have ANY of these:    I do not feel good    Cough or wheeze    Chest feels tight    Wake up at night   1. Keep taking your Green Zone medications  2. Start taking your rescue medicine:    every 20 minutes for up to 1 hour. Then every 4 hours for 24-48 hours.  3. If you stay in the Yellow Zone for more than 12-24 hours, contact your doctor.  4. If you do not return to the Green Zone in 12-24 hours or you get worse, start taking your oral steroid medicine if prescribed by your provider.           RED ZONE Medical Alert - Get Help  I have ANY of these:    I feel awful    Medicine is not helping    Breathing getting harder    Trouble walking or talking    Nose opens wide to breathe       1. Take your rescue medicine NOW  2. If your provider has prescribed an oral steroid medicine, start taking it NOW  3. Call your doctor NOW  4. If you are still in the Red Zone after 20 minutes and you have not reached your doctor:    Take your rescue medicine again and    Call 911 or go to the emergency room right away    See your regular doctor within 2 weeks of an Emergency Room or Urgent Care visit for follow-up  treatment.          Annual Reminders:  Meet with Asthma Educator,  Flu Shot in the Fall, consider Pneumonia Vaccination for patients with asthma (aged 19 and older).    Pharmacy: [facility][id number] [facility] [id number]  [address]                      Asthma Triggers  How To Control Things That Make Your Asthma Worse    Triggers are things that make your asthma worse.  Look at the list below to help you find your triggers and what you can do about them.  You can help prevent asthma flare-ups by staying away from your triggers.      Trigger                                                          What you can do   Cigarette Smoke  Tobacco smoke can make asthma worse. Do not allow smoking in your home, car or around you.  Be sure no one smokes at a child s day care or school.  If you smoke, ask your health care provider for ways to help you quit.  Ask family members to quit too.  Ask your health care provider for a referral to Quit Plan to help you quit smoking, or call 9-439-590-PLAN.     Colds, Flu, Bronchitis  These are common triggers of asthma. Wash your hands often.  Don t touch your eyes, nose or mouth.  Get a flu shot every year.     Dust Mites  These are tiny bugs that live in cloth or carpet. They are too small to see. Wash sheets and blankets in hot water every week.   Encase pillows and mattress in dust mite proof covers.  Avoid having carpet if you can. If you have carpet, vacuum weekly.   Use a dust mask and HEPA vacuum.   Pollen and Outdoor Mold  Some people are allergic to trees, grass, or weed pollen, or molds. Try to keep your windows closed.  Limit time out doors when pollen count is high.   Ask you health care provider about taking medicine during allergy season.     Animal Dander  Some people are allergic to skin flakes, urine or saliva from pets with fur or feathers. Keep pets with fur or feathers out of your home.    If you can t keep the pet outdoors, then keep the pet out of your  bedroom.  Keep the bedroom door closed.  Keep pets off cloth furniture and away from stuffed toys.     Mice, Rats, and Cockroaches  Some people are allergic to the waste from these pests.   Cover food and garbage.  Clean up spills and food crumbs.  Store grease in the refrigerator.   Keep food out of the bedroom.   Indoor Mold  This can be a trigger if your home has high moisture. Fix leaking faucets, pipes, or other sources of water.   Clean moldy surfaces.  Dehumidify basement if it is damp and smelly.   Smoke, Strong Odors, and Sprays  These can reduce air quality. Stay away from strong odors and sprays, such as perfume, powder, hair spray, paints, smoke incense, paint, cleaning products, candles and new carpet.   Exercise or Sports  Some people with asthma have this trigger. Be active!  Ask your doctor about taking medicine before sports or exercise to prevent symptoms.    Warm up for 5-10 minutes before and after sports or exercise.     Other Triggers of Asthma  Cold air:  Cover your nose and mouth with a scarf.  Sometimes laughing or crying can be a trigger.  Some medicines and food can trigger asthma.

## 2018-04-30 NOTE — MR AVS SNAPSHOT
After Visit Summary   4/30/2018    Diana Omer    MRN: [id number]           Patient Information     Date Of Birth          1980        Visit Information        Provider Department      4/30/2018 8:00 AM Javi Clements PA-C [facility]        Today's Diagnoses     Hordeolum, unspecified hordeolum type, unspecified laterality    -  1    Dry eyes           Follow-ups after your visit        Follow-up notes from your care team     Return for with Specialist.      Who to contact     If you have questions or need follow up information about today's clinic visit or your schedule please contact [facility] directly at [phone].  Normal or non-critical lab and imaging results will be communicated to you by [employer], letter or phone within 4 business days after the clinic has received the results. If you do not hear from us within 7 days, please contact the clinic through [employer] or phone. If you have a critical or abnormal lab result, we will notify you by phone as soon as possible.  Submit refill requests through [facility] or call your pharmacy and they will forward the refill request to us. Please allow 3 business days for your refill to be completed.          Additional Information About Your Visit        MyChart Information     [facility] gives you secure access to your electronic health record. If you see a primary care provider, you can also send messages to your care team and make appointments. If you have questions, please call your primary care clinic.  If you do not have a primary care provider, please call [phone] and they will assist you.        Care EveryWhere ID     This is your Care EveryWhere ID. This could be used by other organizations to access your [place] medical records  [id number]        Your Vitals Were     Pulse Temperature Respirations BMI (Body Mass Index)          84 98.1  F (36.7  C) (Oral) 16 21.46 kg/m2         Blood Pressure  from Last 3 Encounters:   04/30/18 98/60   03/26/18 108/65   03/23/18 108/70    Weight from Last 3 Encounters:   04/30/18 127 lb (57.6 kg)   03/26/18 125 lb (56.7 kg)   03/23/18 129 lb (58.5 kg)              We Performed the Following     Anti Nuclear Ines IgG by IFA with Reflex     Basic metabolic panel     CBC with platelets differential     CRP inflammation     Erythrocyte sedimentation rate auto     HIV Antigen Antibody Combo     TSH with free T4 reflex          Today's Medication Changes          These changes are accurate as of 4/30/18  8:40 AM.  If you have any questions, ask your nurse or doctor.               These medicines have changed or have updated prescriptions.        Dose/Directions    * trimethoprim-polymyxin b ophthalmic solution   Commonly known as:  POLYTRIM   This may have changed:  Another medication with the same name was added. Make sure you understand how and when to take each.   Changed by:  Javi Clements PA-C        Dose:  1 drop   Place 1 drop into both eyes every 3 hours   Refills:  0       * trimethoprim-polymyxin b ophthalmic solution   Commonly known as:  POLYTRIM   This may have changed:  You were already taking a medication with the same name, and this prescription was added. Make sure you understand how and when to take each.   Used for:  Hordeolum, unspecified hordeolum type, unspecified laterality   Changed by:  Javi Clements PA-C        Dose:  1 drop   Apply 1 drop to eye 3 times daily   Quantity:  10 mL   Refills:  0       * Notice:  This list has 2 medication(s) that are the same as other medications prescribed for you. Read the directions carefully, and ask your doctor or other care provider to review them with you.         Where to get your medicines      These medications were sent to [facility]/pharm[facility] #[id number] - [place], [place] - [address]  [address]  [address], [facility] [address]     Phone:  [phone]     trimethoprim-polymyxin b ophthalmic solution                 Primary Care Provider Office Phone # Fax #    David Poole [phone]       [id number]  [address] [facility] [address]        Equal Access to Services     GRIFFIN WILLIS : Hadjose narcisa thomason olesyao Soalessandra, waaxda luqadaha, qaybta kaalmada felicitas, chris merino laDarcyjonas schafer. So [facility] [phone].    ATENCIÓN: Si habla español, tiene a olivia disposición servicios gratuitos de asistencia lingüística. Llame al [phone].    We comply with applicable federal civil rights laws and [place] laws. We do not discriminate on the basis of race, color, national origin, age, disability, sex, sexual orientation, or gender identity.            Thank you!     Thank you for choosing [facility]  for your care. Our goal is always to provide you with excellent care. Hearing back from our patients is one way we can continue to improve our services. Please take a few minutes to complete the written survey that you may receive in the mail after your visit with us. Thank you!             Your Updated Medication List - Protect others around you: Learn how to safely use, store and throw away your medicines at www.disposemymeds.org.          This list is accurate as of 4/30/18  8:40 AM.  Always use your most recent med list.                   Brand Name Dispense Instructions for use Diagnosis    albuterol 108 (90 Base) MCG/ACT Inhaler    PROAIR HFA/PROVENTIL HFA/VENTOLIN HFA    1 Inhaler    Inhale 2 puffs into the lungs every 6 hours as needed for shortness of breath / dyspnea or wheezing    Exercise-induced asthma       ALPRAZolam 0.5 MG tablet    XANAX          cephALEXin 500 MG capsule    KEFLEX          erythromycin ophthalmic ointment    ROMYCIN    1 Tube    Place 1 Application into both eyes At Bedtime    Hordeolum, unspecified hordeolum type, unspecified laterality       HYDROcodone-acetaminophen 5-325 MG per tablet    NORCO    15 tablet    Take 1 tablet by mouth  every 6 hours as needed        MULTI FOR HER Tabs      1 TABLET DAILY    Knee pain       PROZAC PO      Take 10 mg by mouth        * trimethoprim-polymyxin b ophthalmic solution    POLYTRIM     Place 1 drop into both eyes every 3 hours        * trimethoprim-polymyxin b ophthalmic solution    POLYTRIM    10 mL    Apply 1 drop to eye 3 times daily    Hordeolum, unspecified hordeolum type, unspecified laterality       VITAMIN C PO      Take 500 mg by mouth        VITAMIN D (ERGOCALCIFEROL) PO      None Entered    Knee pain       * Notice:  This list has 2 medication(s) that are the same as other medications prescribed for you. Read the directions carefully, and ask your doctor or other care provider to review them with you.

## 2018-05-01 LAB
ANA SER QL IF: NEGATIVE
ANION GAP SERPL CALCULATED.3IONS-SCNC: 8 MMOL/L (ref 3–14)
BUN SERPL-MCNC: 9 MG/DL (ref 7–30)
CALCIUM SERPL-MCNC: 9 MG/DL (ref 8.5–10.1)
CHLORIDE SERPL-SCNC: 108 MMOL/L (ref 94–109)
CO2 SERPL-SCNC: 26 MMOL/L (ref 20–32)
CREAT SERPL-MCNC: 0.63 MG/DL (ref 0.52–1.04)
GFR SERPL CREATININE-BSD FRML MDRD: >90 ML/MIN/1.7M2
GLUCOSE SERPL-MCNC: 62 MG/DL (ref 70–99)
POTASSIUM SERPL-SCNC: 4.7 MMOL/L (ref 3.4–5.3)
SODIUM SERPL-SCNC: 142 MMOL/L (ref 133–144)
TSH SERPL DL<=0.005 MIU/L-ACNC: 1.16 MU/L (ref 0.4–4)

## 2018-05-01 ASSESSMENT — ASTHMA QUESTIONNAIRES: ACT_TOTALSCORE: 25

## 2018-08-01 NOTE — PROGRESS NOTES
SUBJECTIVE:   Diana Omer is a 37 year old female who presents to clinic today for the following health issues:      Vaginal Symptoms  - poss STD?  Onset: couple weeks     Description:  Vaginal Discharge: curd-like   Itching (Pruritis): YES  Burning sensation:  no   Odor: YES    Accompanying Signs & Symptoms:  Pain with Urination: no   Abdominal Pain: no   Fever: no     History:   Sexually active: YES  New Partner: YES  Possibility of Pregnancy:  No    Precipitating factors:   Recent Antibiotic Use: YES    Alleviating factors:  Alternative treatments(tea tree oils), seemed to help    Therapies Tried and outcome: see above    Diana recently started dating someone new and started initially having a fishy odor along with some vaginal discharge.  She also is concerned about STD's as she has learned this person has been sleeping with another partner at the same time as her.  She would like screening for STD's today.      Problem list and histories reviewed & adjusted, as indicated.  Additional history: as documented      Reviewed and updated as needed this visit by clinical staff  Tobacco  Allergies  Meds  Problems  Med Hx  Surg Hx  Fam Hx  Soc Hx        Reviewed and updated as needed this visit by Provider         ROS:  Constitutional, HEENT, cardiovascular, pulmonary, gi and gu systems are negative, except as otherwise noted.    OBJECTIVE:     /56 (BP Location: Right arm, Patient Position: Sitting, Cuff Size: Adult Regular)  Pulse 96  Temp 98.5  F (36.9  C) (Oral)  Resp 16  Wt 121 lb (54.9 kg)  BMI 20.45 kg/m2  Body mass index is 20.45 kg/(m^2).  GENERAL: healthy, alert and no distress  MS: no gross musculoskeletal defects noted, no edema  SKIN: no suspicious lesions or rashes  PSYCH: mentation appears normal, affect normal/bright    Diagnostic Test Results:  Results for orders placed or performed in visit on 08/02/18 (from the past 24 hour(s))   Wet prep   Result Value Ref Range    Specimen  Description Vagina     Wet Prep Clue cells seen (A)     Wet Prep No Trichomonas seen     Wet Prep No yeast seen        ASSESSMENT/PLAN:   1. Vaginal discharge  + for Clue cells, will treat for BV and await results for STD testing.    - Wet prep  - Chlamydia trachomatis PCR  - Neisseria gonorrhoeae PCR    2. BV (bacterial vaginosis)    - metroNIDAZOLE (METROGEL) 0.75 % vaginal gel; Place 1 applicator (5 g) vaginally At Bedtime for 5 days  Dispense: 70 g; Refill: 0      Javi Clements PA-C  [facility]

## 2018-08-02 ENCOUNTER — OFFICE VISIT (OUTPATIENT)
Dept: FAMILY MEDICINE | Facility: CLINIC | Age: 38
End: 2018-08-02
Payer: COMMERCIAL

## 2018-08-02 VITALS
TEMPERATURE: 98.5 F | RESPIRATION RATE: 16 BRPM | WEIGHT: 121 LBS | BODY MASS INDEX: 20.45 KG/M2 | HEART RATE: 96 BPM | SYSTOLIC BLOOD PRESSURE: 104 MMHG | DIASTOLIC BLOOD PRESSURE: 56 MMHG

## 2018-08-02 DIAGNOSIS — B96.89 BV (BACTERIAL VAGINOSIS): ICD-10-CM

## 2018-08-02 DIAGNOSIS — N76.0 BV (BACTERIAL VAGINOSIS): ICD-10-CM

## 2018-08-02 DIAGNOSIS — N89.8 VAGINAL DISCHARGE: Primary | ICD-10-CM

## 2018-08-02 LAB
SPECIMEN SOURCE: ABNORMAL
WET PREP SPEC: ABNORMAL

## 2018-08-02 PROCEDURE — 99213 OFFICE O/P EST LOW 20 MIN: CPT | Performed by: PHYSICIAN ASSISTANT

## 2018-08-02 PROCEDURE — 87491 CHLMYD TRACH DNA AMP PROBE: CPT | Performed by: PHYSICIAN ASSISTANT

## 2018-08-02 PROCEDURE — 87210 SMEAR WET MOUNT SALINE/INK: CPT | Performed by: PHYSICIAN ASSISTANT

## 2018-08-02 PROCEDURE — 87591 N.GONORRHOEAE DNA AMP PROB: CPT | Performed by: PHYSICIAN ASSISTANT

## 2018-08-02 RX ORDER — METRONIDAZOLE 7.5 MG/G
1 GEL VAGINAL AT BEDTIME
Qty: 70 G | Refills: 0 | Status: SHIPPED | OUTPATIENT
Start: 2018-08-02 | End: 2018-08-07

## 2018-08-02 NOTE — MR AVS SNAPSHOT
After Visit Summary   8/2/2018    Diana Omer    MRN: [id number]           Patient Information     Date Of Birth          1980        Visit Information        Provider Department      8/2/2018 8:40 AM Javi Clements PA-C [facility]        Today's Diagnoses     Vaginal discharge    -  1       Follow-ups after your visit        Follow-up notes from your care team     Return for Follow up- if not improving.      Who to contact     If you have questions or need follow up information about today's clinic visit or your schedule please contact [facility] directly at [phone].  Normal or non-critical lab and imaging results will be communicated to you by [employer], letter or phone within 4 business days after the clinic has received the results. If you do not hear from us within 7 days, please contact the clinic through [employer] or phone. If you have a critical or abnormal lab result, we will notify you by phone as soon as possible.  Submit refill requests through [employer] or call your pharmacy and they will forward the refill request to us. Please allow 3 business days for your refill to be completed.          Additional Information About Your Visit        MyChart Information     [employer] gives you secure access to your electronic health record. If you see a primary care provider, you can also send messages to your care team and make appointments. If you have questions, please call your primary care clinic.  If you do not have a primary care provider, please call [phone] and they will assist you.        Care EveryWhere ID     This is your Care EveryWhere ID. This could be used by other organizations to access your [place] medical records  [id number]        Your Vitals Were     Pulse Temperature Respirations BMI (Body Mass Index)          96 98.5  F (36.9  C) (Oral) 16 20.45 kg/m2         Blood Pressure from Last 3 Encounters:   08/02/18 104/56    04/30/18 98/60   03/26/18 108/65    Weight from Last 3 Encounters:   08/02/18 121 lb (54.9 kg)   04/30/18 127 lb (57.6 kg)   03/26/18 125 lb (56.7 kg)              We Performed the Following     Chlamydia trachomatis PCR     Neisseria gonorrhoeae PCR     Wet prep        Primary Care Provider Office Phone # Fax #    Javi Belinda Clements PA-C [phone]       [id number]  [facility] [address]        Equal Access to Services     [facility] : Hadii aad ku hadasho Soomaali, waaxda luqadaha, qaybta kaalmada adeegyada, waxay idiin hayaleksandern dragan iyer . So [facility] [phone].    ATENCIÓN: Si habla español, tiene a olivia disposición servicios gratuitos de asistencia lingüística. Llame al [phone].    We comply with applicable federal civil rights laws and [place] laws. We do not discriminate on the basis of race, color, national origin, age, disability, sex, sexual orientation, or gender identity.            Thank you!     Thank you for choosing [facility]  for your care. Our goal is always to provide you with excellent care. Hearing back from our patients is one way we can continue to improve our services. Please take a few minutes to complete the written survey that you may receive in the mail after your visit with us. Thank you!             Your Updated Medication List - Protect others around you: Learn how to safely use, store and throw away your medicines at www.disposemymeds.org.          This list is accurate as of 8/2/18  9:04 AM.  Always use your most recent med list.                   Brand Name Dispense Instructions for use Diagnosis    albuterol 108 (90 Base) MCG/ACT Inhaler    PROAIR HFA/PROVENTIL HFA/VENTOLIN HFA    1 Inhaler    Inhale 2 puffs into the lungs every 6 hours as needed for shortness of breath / dyspnea or wheezing    Exercise-induced asthma       ALPRAZolam 0.5 MG tablet    XANAX          MULTI FOR HER Tabs      1 TABLET DAILY    Knee pain        PROZAC PO      Take 10 mg by mouth        VITAMIN C PO      Take 500 mg by mouth        VITAMIN D (ERGOCALCIFEROL) PO      None Entered    Knee pain

## 2018-08-03 LAB
C TRACH DNA SPEC QL NAA+PROBE: NEGATIVE
N GONORRHOEA DNA SPEC QL NAA+PROBE: NEGATIVE
SPECIMEN SOURCE: NORMAL
SPECIMEN SOURCE: NORMAL

## 2018-10-22 NOTE — ED NOTES
"Pt presents to ED c/o L eye vision changes. Pt has is currently being tx for multiple styes in both eyes. On day 5 of keflex po and using erythromycin optho ointment. Pt states that vision in both eyes became blurry yesterday. Now R eye is back to reema, but pt continues to have blurred vision in L eye. Pt also c/o lump on R cheekbone that is getting larger and pressure \"from the ears up.\" Pt denies fevers. ABC intact. A/O x4. [facility] [place].   " Advised patient of below. Pt states she is achy and tired but denies any symptoms. Scheduled appointment with Dr. MADISON for 10.29.18 at 2.30 pm. Pt verbalized understanding.

## 2018-12-20 ENCOUNTER — OFFICE VISIT (OUTPATIENT)
Dept: FAMILY MEDICINE | Facility: CLINIC | Age: 38
End: 2018-12-20
Payer: COMMERCIAL

## 2018-12-20 VITALS
RESPIRATION RATE: 16 BRPM | SYSTOLIC BLOOD PRESSURE: 105 MMHG | TEMPERATURE: 98.2 F | DIASTOLIC BLOOD PRESSURE: 68 MMHG | HEART RATE: 84 BPM | WEIGHT: 123 LBS | BODY MASS INDEX: 20.79 KG/M2

## 2018-12-20 DIAGNOSIS — N64.4 PAIN OF BOTH BREASTS: Primary | ICD-10-CM

## 2018-12-20 DIAGNOSIS — N60.11 BILATERAL FIBROCYSTIC BREAST DISEASE (FCBD): ICD-10-CM

## 2018-12-20 DIAGNOSIS — N60.12 BILATERAL FIBROCYSTIC BREAST DISEASE (FCBD): ICD-10-CM

## 2018-12-20 PROCEDURE — 99213 OFFICE O/P EST LOW 20 MIN: CPT | Performed by: PHYSICIAN ASSISTANT

## 2018-12-20 ASSESSMENT — PATIENT HEALTH QUESTIONNAIRE - PHQ9: SUM OF ALL RESPONSES TO PHQ QUESTIONS 1-9: 0

## 2018-12-20 NOTE — PROGRESS NOTES
"  SUBJECTIVE:   Diana Omer is a 38 year old female who presents to clinic today for the following health issues:      Concern - breast pain and lumps  Onset: recheck    Description:   Right side breast    Intensity: moderate    Progression of Symptoms:  worsening and intermittent    Accompanying Signs & Symptoms:  none    Previous history of similar problem:   Yes, see chart    Precipitating factors:   Worsened by: monthly cycle, but has had hysterectomy     Alleviating factors:  Improved by: nothing    Therapies Tried and outcome: ?    New lump right side, up under arm.  Has a hard time doing self exams due to \"lumpy breasts\".  Thinks there is sort of a cycle to it though she has had a hysterectomy.  Breast pain has been worse since hysterectomy.   Has this in 2011 for endometriosis and uterine cysts.  Has one ovary remaining.  Took OCP's when younger for years.        Problem list and histories reviewed & adjusted, as indicated.  Additional history: as documented      Reviewed and updated as needed this visit by clinical staff  Tobacco  Allergies  Meds  Med Hx  Surg Hx  Fam Hx  Soc Hx      Reviewed and updated as needed this visit by Provider         ROS:  Constitutional, HEENT, cardiovascular, pulmonary, gi and gu systems are negative, except as otherwise noted.    OBJECTIVE:     /68 (BP Location: Right arm, Patient Position: Sitting, Cuff Size: Adult Regular)   Pulse 84   Temp 98.2  F (36.8  C) (Oral)   Resp 16   Wt 55.8 kg (123 lb)   BMI 20.79 kg/m    Body mass index is 20.79 kg/m .  GENERAL: healthy, alert and no distress  BREAST: no palpable axillary masses or adenopathy and moderate/severe fibrocystic changes bilateral- possible cluster of cysts noted in the right breast lateral to the nipple  MS: no gross musculoskeletal defects noted, no edema  SKIN: no suspicious lesions or rashes  PSYCH: mentation appears normal, affect normal/bright    Diagnostic Test Results:  none "     ASSESSMENT/PLAN:   1. Pain of both breasts  Discussed options for controlling pain.  She is already trying vitamin E without a whole lot of success.  Supportive under garments, ibuprofen, heat.  Will obtain mammogram as she does have concerns of possible lump on the right.  - MA Diagnostic Digital Bilateral; Future    2. Bilateral fibrocystic breast disease (FCBD)    - MA Diagnostic Digital Bilateral; Future        Javi Clemetns PA-C  [facility]

## 2018-12-21 ASSESSMENT — ASTHMA QUESTIONNAIRES: ACT_TOTALSCORE: 22

## 2019-01-03 ENCOUNTER — HOSPITAL ENCOUNTER (OUTPATIENT)
Dept: MAMMOGRAPHY | Facility: CLINIC | Age: 39
Discharge: HOME | End: 2019-01-03
Attending: PHYSICIAN ASSISTANT | Admitting: PHYSICIAN ASSISTANT
Payer: COMMERCIAL

## 2019-01-03 ENCOUNTER — HOSPITAL ENCOUNTER (OUTPATIENT)
Dept: ULTRASOUND IMAGING | Facility: CLINIC | Age: 39
End: 2019-01-03
Attending: PHYSICIAN ASSISTANT
Payer: COMMERCIAL

## 2019-01-03 DIAGNOSIS — N64.4 PAIN OF BOTH BREASTS: ICD-10-CM

## 2019-01-03 DIAGNOSIS — N60.11 BILATERAL FIBROCYSTIC BREAST DISEASE (FCBD): ICD-10-CM

## 2019-01-03 DIAGNOSIS — N60.12 BILATERAL FIBROCYSTIC BREAST DISEASE (FCBD): ICD-10-CM

## 2019-01-03 PROCEDURE — 77066 DX MAMMO INCL CAD BI: CPT

## 2019-01-03 PROCEDURE — G0279 TOMOSYNTHESIS, MAMMO: HCPCS

## 2019-01-03 PROCEDURE — 76642 ULTRASOUND BREAST LIMITED: CPT | Mod: RT

## 2020-03-01 ENCOUNTER — HEALTH MAINTENANCE LETTER (OUTPATIENT)
Age: 40
End: 2020-03-01

## 2020-04-02 ENCOUNTER — E-VISIT (OUTPATIENT)
Dept: FAMILY MEDICINE | Facility: CLINIC | Age: 40
End: 2020-04-02
Payer: COMMERCIAL

## 2020-04-02 ENCOUNTER — MYC MEDICAL ADVICE (OUTPATIENT)
Dept: FAMILY MEDICINE | Facility: CLINIC | Age: 40
End: 2020-04-02

## 2020-04-02 DIAGNOSIS — J45.990 EXERCISE-INDUCED ASTHMA: Primary | ICD-10-CM

## 2020-04-02 PROCEDURE — 99421 OL DIG E/M SVC 5-10 MIN: CPT | Performed by: PHYSICIAN ASSISTANT

## 2020-04-02 RX ORDER — ALBUTEROL SULFATE 90 UG/1
2 AEROSOL, METERED RESPIRATORY (INHALATION) EVERY 6 HOURS
Qty: 1 INHALER | Refills: 1 | Status: SHIPPED | OUTPATIENT
Start: 2020-04-02

## 2020-04-02 NOTE — TELEPHONE ENCOUNTER
Sent PHQ-9, JOSE DAVID-7 and ACT on [employer] to complete.    Provider E-Visit time total (minutes):

## 2020-04-02 NOTE — TELEPHONE ENCOUNTER
Provider E-Visit time total (minutes): 5      Please send PHQ and ACT to patient to get these updated.  If there is a specific e-visit for asthma she didn't chose it like you asked.    I will complete this one and send her an inhaler now but please follow up with her.      Thanks,  Javi

## 2020-04-04 ASSESSMENT — ANXIETY QUESTIONNAIRES
7. FEELING AFRAID AS IF SOMETHING AWFUL MIGHT HAPPEN: SEVERAL DAYS
1. FEELING NERVOUS, ANXIOUS, OR ON EDGE: SEVERAL DAYS
3. WORRYING TOO MUCH ABOUT DIFFERENT THINGS: SEVERAL DAYS
4. TROUBLE RELAXING: SEVERAL DAYS
GAD7 TOTAL SCORE: 6
5. BEING SO RESTLESS THAT IT IS HARD TO SIT STILL: NOT AT ALL
GAD7 TOTAL SCORE: 6
GAD7 TOTAL SCORE: 6
6. BECOMING EASILY ANNOYED OR IRRITABLE: SEVERAL DAYS
2. NOT BEING ABLE TO STOP OR CONTROL WORRYING: SEVERAL DAYS
7. FEELING AFRAID AS IF SOMETHING AWFUL MIGHT HAPPEN: SEVERAL DAYS

## 2020-04-04 ASSESSMENT — PATIENT HEALTH QUESTIONNAIRE - PHQ9
10. IF YOU CHECKED OFF ANY PROBLEMS, HOW DIFFICULT HAVE THESE PROBLEMS MADE IT FOR YOU TO DO YOUR WORK, TAKE CARE OF THINGS AT HOME, OR GET ALONG WITH OTHER PEOPLE: NOT DIFFICULT AT ALL
SUM OF ALL RESPONSES TO PHQ QUESTIONS 1-9: 4
SUM OF ALL RESPONSES TO PHQ QUESTIONS 1-9: 4

## 2020-04-05 ASSESSMENT — PATIENT HEALTH QUESTIONNAIRE - PHQ9: SUM OF ALL RESPONSES TO PHQ QUESTIONS 1-9: 4

## 2020-04-05 ASSESSMENT — ANXIETY QUESTIONNAIRES: GAD7 TOTAL SCORE: 6

## 2020-12-13 ENCOUNTER — HEALTH MAINTENANCE LETTER (OUTPATIENT)
Age: 40
End: 2020-12-13

## 2021-04-17 ENCOUNTER — HEALTH MAINTENANCE LETTER (OUTPATIENT)
Age: 41
End: 2021-04-17

## 2021-07-16 ENCOUNTER — RECORDS - HEALTHEAST (OUTPATIENT)
Dept: ADMINISTRATIVE | Facility: CLINIC | Age: 41
End: 2021-07-16

## 2021-10-02 ENCOUNTER — HEALTH MAINTENANCE LETTER (OUTPATIENT)
Age: 41
End: 2021-10-02

## 2021-10-19 PROBLEM — F32.9 MAJOR DEPRESSION: Status: ACTIVE | Noted: 2017-11-09

## 2022-05-08 ENCOUNTER — HEALTH MAINTENANCE LETTER (OUTPATIENT)
Age: 42
End: 2022-05-08

## 2023-01-14 ENCOUNTER — HEALTH MAINTENANCE LETTER (OUTPATIENT)
Age: 43
End: 2023-01-14

## 2023-06-02 ENCOUNTER — HEALTH MAINTENANCE LETTER (OUTPATIENT)
Age: 43
End: 2023-06-02

## 2024-02-11 ENCOUNTER — HEALTH MAINTENANCE LETTER (OUTPATIENT)
Age: 44
End: 2024-02-11